# Patient Record
Sex: MALE | Race: WHITE | Employment: OTHER | URBAN - METROPOLITAN AREA
[De-identification: names, ages, dates, MRNs, and addresses within clinical notes are randomized per-mention and may not be internally consistent; named-entity substitution may affect disease eponyms.]

---

## 2018-07-05 ENCOUNTER — HOSPITAL ENCOUNTER (OUTPATIENT)
Dept: NEUROLOGY | Age: 23
Discharge: HOME | End: 2018-07-05
Payer: COMMERCIAL

## 2018-07-05 PROCEDURE — 95819 EEG AWAKE AND ASLEEP: CPT

## 2018-07-05 PROCEDURE — 95819 EEG AWAKE AND ASLEEP: CPT | Performed by: PSYCHIATRY & NEUROLOGY

## 2019-08-13 ENCOUNTER — HOSPITAL ENCOUNTER (OUTPATIENT)
Age: 24
Discharge: HOME | End: 2019-08-15
Payer: COMMERCIAL

## 2019-08-13 PROCEDURE — 84480 ASSAY TRIIODOTHYRONINE (T3): CPT

## 2019-08-13 PROCEDURE — 85027 COMPLETE CBC AUTOMATED: CPT

## 2019-08-13 PROCEDURE — 80053 COMPREHEN METABOLIC PANEL: CPT

## 2019-08-13 PROCEDURE — 80061 LIPID PANEL: CPT

## 2019-08-13 PROCEDURE — 84443 ASSAY THYROID STIM HORMONE: CPT

## 2019-08-13 PROCEDURE — 84436 ASSAY OF TOTAL THYROXINE: CPT

## 2019-08-14 LAB
ALBUMIN SERPL-MCNC: 4.4 G/DL (ref 3.5–5.2)
ALP BLD-CCNC: 97 U/L (ref 40–129)
ALT SERPL-CCNC: 19 U/L (ref 0–40)
ANION GAP SERPL CALCULATED.3IONS-SCNC: 14 MMOL/L (ref 7–16)
AST SERPL-CCNC: 21 U/L (ref 0–39)
BILIRUB SERPL-MCNC: 0.3 MG/DL (ref 0–1.2)
BUN BLDV-MCNC: 13 MG/DL (ref 6–20)
CALCIUM SERPL-MCNC: 9.7 MG/DL (ref 8.6–10.2)
CHLORIDE BLD-SCNC: 103 MMOL/L (ref 98–107)
CHOLESTEROL, TOTAL: 207 MG/DL (ref 0–199)
CO2: 25 MMOL/L (ref 22–29)
CREAT SERPL-MCNC: 0.9 MG/DL (ref 0.7–1.2)
GFR AFRICAN AMERICAN: >60
GFR NON-AFRICAN AMERICAN: >60 ML/MIN/1.73
GLUCOSE BLD-MCNC: 89 MG/DL (ref 74–99)
HCT VFR BLD CALC: 46.7 % (ref 37–54)
HDLC SERPL-MCNC: 56 MG/DL
HEMOGLOBIN: 15.1 G/DL (ref 12.5–16.5)
LDL CHOLESTEROL CALCULATED: 139 MG/DL (ref 0–99)
MCH RBC QN AUTO: 28.3 PG (ref 26–35)
MCHC RBC AUTO-ENTMCNC: 32.3 % (ref 32–34.5)
MCV RBC AUTO: 87.6 FL (ref 80–99.9)
PDW BLD-RTO: 13.1 FL (ref 11.5–15)
PLATELET # BLD: 275 E9/L (ref 130–450)
PMV BLD AUTO: 10 FL (ref 7–12)
POTASSIUM SERPL-SCNC: 4.6 MMOL/L (ref 3.5–5)
RBC # BLD: 5.33 E12/L (ref 3.8–5.8)
SODIUM BLD-SCNC: 142 MMOL/L (ref 132–146)
T3 TOTAL: 146.4 NG/DL (ref 80–200)
T4 TOTAL: 6.2 MCG/DL (ref 4.5–11.7)
TOTAL PROTEIN: 8.2 G/DL (ref 6.4–8.3)
TRIGL SERPL-MCNC: 62 MG/DL (ref 0–149)
TSH SERPL DL<=0.05 MIU/L-ACNC: 3.13 UIU/ML (ref 0.27–4.2)
VLDLC SERPL CALC-MCNC: 12 MG/DL
WBC # BLD: 7.2 E9/L (ref 4.5–11.5)

## 2019-08-19 ENCOUNTER — HOSPITAL ENCOUNTER (OUTPATIENT)
Age: 24
Discharge: HOME | End: 2019-08-21
Payer: COMMERCIAL

## 2019-08-19 PROCEDURE — 87591 N.GONORRHOEAE DNA AMP PROB: CPT

## 2019-08-19 PROCEDURE — 87491 CHLMYD TRACH DNA AMP PROBE: CPT

## 2019-08-22 LAB
C. TRACHOMATIS DNA ,URINE: NEGATIVE
N. GONORRHOEAE DNA, URINE: NEGATIVE
SOURCE: NORMAL

## 2019-09-27 ENCOUNTER — HOSPITAL ENCOUNTER (EMERGENCY)
Age: 24
Discharge: HOME | End: 2019-09-27
Attending: EMERGENCY MEDICINE
Payer: COMMERCIAL

## 2019-09-27 VITALS
HEART RATE: 90 BPM | HEIGHT: 78 IN | OXYGEN SATURATION: 97 % | TEMPERATURE: 98.8 F | SYSTOLIC BLOOD PRESSURE: 182 MMHG | RESPIRATION RATE: 20 BRPM | BODY MASS INDEX: 36.45 KG/M2 | DIASTOLIC BLOOD PRESSURE: 92 MMHG | WEIGHT: 315 LBS

## 2019-09-27 DIAGNOSIS — K62.5 RECTAL BLEEDING: Primary | ICD-10-CM

## 2019-09-27 PROCEDURE — 99282 EMERGENCY DEPT VISIT SF MDM: CPT

## 2019-09-27 ASSESSMENT — ENCOUNTER SYMPTOMS
EYE PAIN: 0
BLOOD IN STOOL: 1
NAUSEA: 0
WHEEZING: 0
SORE THROAT: 0
EYE DISCHARGE: 0
ABDOMINAL PAIN: 0
DIARRHEA: 0
EYE REDNESS: 0
SINUS PRESSURE: 0
BACK PAIN: 0
COUGH: 0
VOMITING: 0
SHORTNESS OF BREATH: 0

## 2019-09-27 NOTE — ED PROVIDER NOTES
hemorrhoid and guaiac positive stool. Rectal exam shows no external hemorrhoid, no fissure, no mass and no tenderness. Musculoskeletal: Normal range of motion. He exhibits no edema. Neurological: He is alert and oriented to person, place, and time. Skin: Skin is warm and dry. No rash noted. He is not diaphoretic. Nursing note and vitals reviewed. Procedures     [facility]              --------------------------------------------- PAST HISTORY ---------------------------------------------  Past Medical History:  has a past medical history of Autism, [facility] ([facility] and Seizures ([facility] Past Surgical History:  has no past surgical history on file. Social History:      Family History: family history is not on file. The patients home medications have been reviewed. Allergies: Patient has no known allergies. -------------------------------------------------- RESULTS -------------------------------------------------  Labs:  No results found for this visit on 09/27/19. Radiology:  No orders to display       ------------------------- NURSING NOTES AND VITALS REVIEWED ---------------------------  Date / Time Roomed:  9/27/2019  3:29 AM  ED Bed Assignment:  14/14    The nursing notes within the ED encounter and vital signs as below have been reviewed. BP (!) 182/92   Pulse 90   Temp 98.8 °F (37.1 °C) (Oral)   Resp 20   Ht 6' 6.5\" (1.994 m)   Wt (!) 317 lb (143.8 kg)   SpO2 97%   BMI 36.17 kg/m²   Oxygen Saturation Interpretation: Normal      ------------------------------------------ PROGRESS NOTES ------------------------------------------  I have spoken with the patient and discussed todays results, in addition to providing specific details for the plan of care and counseling regarding the diagnosis and prognosis. Their questions are answered at this time and they are agreeable with the plan. I discussed at length with them reasons for immediate return here for re evaluation.

## 2019-10-04 ENCOUNTER — HOSPITAL ENCOUNTER (OUTPATIENT)
Age: 24
Discharge: HOME | End: 2019-10-06
Payer: MEDICARE

## 2019-10-04 LAB — PROSTATE SPECIFIC ANTIGEN: 0.49 NG/ML (ref 0–4)

## 2019-10-04 PROCEDURE — G0103 PSA SCREENING: HCPCS

## 2020-02-19 ENCOUNTER — HOSPITAL ENCOUNTER (OUTPATIENT)
Age: 25
Discharge: HOME | End: 2020-02-21
Payer: COMMERCIAL

## 2020-02-19 LAB
ALBUMIN SERPL-MCNC: 4.6 G/DL (ref 3.5–5.2)
ALP BLD-CCNC: 88 U/L (ref 40–129)
ALT SERPL-CCNC: 28 U/L (ref 0–40)
ANION GAP SERPL CALCULATED.3IONS-SCNC: 13 MMOL/L (ref 7–16)
AST SERPL-CCNC: 22 U/L (ref 0–39)
BILIRUB SERPL-MCNC: 0.2 MG/DL (ref 0–1.2)
BUN BLDV-MCNC: 11 MG/DL (ref 6–20)
CALCIUM SERPL-MCNC: 10.1 MG/DL (ref 8.6–10.2)
CHLORIDE BLD-SCNC: 104 MMOL/L (ref 98–107)
CO2: 23 MMOL/L (ref 22–29)
CREAT SERPL-MCNC: 0.8 MG/DL (ref 0.7–1.2)
GFR AFRICAN AMERICAN: >60
GFR NON-AFRICAN AMERICAN: >60 ML/MIN/1.73
GLUCOSE BLD-MCNC: 100 MG/DL (ref 74–99)
HCT VFR BLD CALC: 46.9 % (ref 37–54)
HEMOGLOBIN: 15.1 G/DL (ref 12.5–16.5)
MCH RBC QN AUTO: 28.3 PG (ref 26–35)
MCHC RBC AUTO-ENTMCNC: 32.2 % (ref 32–34.5)
MCV RBC AUTO: 88 FL (ref 80–99.9)
PDW BLD-RTO: 13.1 FL (ref 11.5–15)
PLATELET # BLD: 277 E9/L (ref 130–450)
PMV BLD AUTO: 10.1 FL (ref 7–12)
POTASSIUM SERPL-SCNC: 4.3 MMOL/L (ref 3.5–5)
RBC # BLD: 5.33 E12/L (ref 3.8–5.8)
SODIUM BLD-SCNC: 140 MMOL/L (ref 132–146)
TOTAL PROTEIN: 8.1 G/DL (ref 6.4–8.3)
TSH SERPL DL<=0.05 MIU/L-ACNC: 2.47 UIU/ML (ref 0.27–4.2)
WBC # BLD: 6.8 E9/L (ref 4.5–11.5)

## 2020-02-19 PROCEDURE — 85027 COMPLETE CBC AUTOMATED: CPT

## 2020-02-19 PROCEDURE — 84443 ASSAY THYROID STIM HORMONE: CPT

## 2020-02-19 PROCEDURE — 80053 COMPREHEN METABOLIC PANEL: CPT

## 2021-04-01 ENCOUNTER — TELEPHONE (OUTPATIENT)
Dept: ADMINISTRATIVE | Age: 26
End: 2021-04-01

## 2021-04-01 NOTE — TELEPHONE ENCOUNTER
Pt called requesting a NP appt with you. I informed him you were not accepting patients at this time, but he thinks his mom already spoke to you. He went on to say he is a current pt of Dr Eduardo Gupta, and he needs to transfer care. He happened to mention his mom's name, Noreen Justice) and I was able to see she is your patient. There is a note in his chart that he is permanently disabled and perhaps that conversation did take place. I believe the pt understands--I wasn't promising him an appointment and I assured him--I would call him back. I thought (for this call) I should send you the I information and you would let me know how to proceed.         Please advise,       Thank you

## 2021-04-01 NOTE — TELEPHONE ENCOUNTER
I'm seeing his other family members, so I can see him, but no chronic narcotics or benzo medications

## 2021-05-04 ENCOUNTER — OFFICE VISIT (OUTPATIENT)
Dept: PRIMARY CARE CLINIC | Age: 26
End: 2021-05-04
Payer: COMMERCIAL

## 2021-05-04 VITALS
HEART RATE: 92 BPM | HEIGHT: 78 IN | DIASTOLIC BLOOD PRESSURE: 86 MMHG | TEMPERATURE: 97 F | OXYGEN SATURATION: 97 % | WEIGHT: 315 LBS | SYSTOLIC BLOOD PRESSURE: 136 MMHG | BODY MASS INDEX: 36.45 KG/M2

## 2021-05-04 DIAGNOSIS — R73.01 IMPAIRED FASTING GLUCOSE: ICD-10-CM

## 2021-05-04 DIAGNOSIS — F84.0 AUTISM: ICD-10-CM

## 2021-05-04 DIAGNOSIS — I10 ESSENTIAL HYPERTENSION: Primary | ICD-10-CM

## 2021-05-04 DIAGNOSIS — E55.9 VITAMIN D INSUFFICIENCY: ICD-10-CM

## 2021-05-04 PROCEDURE — 1036F TOBACCO NON-USER: CPT | Performed by: FAMILY MEDICINE

## 2021-05-04 PROCEDURE — 99204 OFFICE O/P NEW MOD 45 MIN: CPT | Performed by: FAMILY MEDICINE

## 2021-05-04 PROCEDURE — G8427 DOCREV CUR MEDS BY ELIG CLIN: HCPCS | Performed by: FAMILY MEDICINE

## 2021-05-04 PROCEDURE — G8417 CALC BMI ABV UP PARAM F/U: HCPCS | Performed by: FAMILY MEDICINE

## 2021-05-04 RX ORDER — LAMOTRIGINE 200 MG/1
200 TABLET ORAL DAILY
COMMUNITY

## 2021-05-04 RX ORDER — TRIAMCINOLONE ACETONIDE 1 MG/G
CREAM TOPICAL 2 TIMES DAILY
COMMUNITY

## 2021-05-04 RX ORDER — CLONIDINE HYDROCHLORIDE 0.2 MG/1
0.2 TABLET ORAL NIGHTLY
COMMUNITY
End: 2021-09-14

## 2021-05-04 RX ORDER — DEXMETHYLPHENIDATE HYDROCHLORIDE 10 MG/1
10 TABLET ORAL 3 TIMES DAILY
COMMUNITY

## 2021-05-04 RX ORDER — LISINOPRIL 10 MG/1
10 TABLET ORAL DAILY
Qty: 90 TABLET | Refills: 1 | Status: SHIPPED
Start: 2021-05-04 | End: 2021-12-03 | Stop reason: SDUPTHER

## 2021-05-04 RX ORDER — LISINOPRIL 10 MG/1
10 TABLET ORAL DAILY
COMMUNITY
End: 2021-05-04 | Stop reason: SDUPTHER

## 2021-05-04 ASSESSMENT — PATIENT HEALTH QUESTIONNAIRE - PHQ9
SUM OF ALL RESPONSES TO PHQ9 QUESTIONS 1 & 2: 2
SUM OF ALL RESPONSES TO PHQ QUESTIONS 1-9: 2
1. LITTLE INTEREST OR PLEASURE IN DOING THINGS: 1
SUM OF ALL RESPONSES TO PHQ QUESTIONS 1-9: 2

## 2021-05-04 ASSESSMENT — ENCOUNTER SYMPTOMS
DIARRHEA: 0
SHORTNESS OF BREATH: 0
VOMITING: 0
COUGH: 0
ABDOMINAL PAIN: 0
NAUSEA: 0
BACK PAIN: 0
CONSTIPATION: 0
WHEEZING: 0

## 2021-05-04 NOTE — PROGRESS NOTES
21  Driss Stager : 1995 Sex: male  Age: Jose 39 y.o. Chief Complaint   Patient presents with   [address]     previous pcp dr Ronald Ridley     HPI:  Jose 39 y.o. male presents today to establish care with a new PCP. Previously seen by Dr. Kiersten Moran. Patient's chart, medical, surgical and medication history all reviewed. Hypertension   The patient presents today for follow up of HTN. The problem is well controlled. Risk factors for coronary artery disease include male, HTN and family Hx. Current treatments include lisinopril (Prinivil). The patient is compliant all of the time. Lifestyle changes the patient has made include none. Today the patient is complaining of none. Patient has a long standing history of autism. Follows with Dr. Cindi Guerra for medication management. ROS:  Review of Systems   Constitutional: Negative for chills, fatigue and fever. Respiratory: Negative for cough, shortness of breath and wheezing. Cardiovascular: Negative for chest pain and palpitations. Gastrointestinal: Negative for abdominal pain, constipation, diarrhea, nausea and vomiting. Musculoskeletal: Negative for arthralgias and back pain. Skin: Negative for rash. Neurological: Negative for dizziness and headaches. Psychiatric/Behavioral: Positive for behavioral problems. Negative for dysphoric mood. The patient is not nervous/anxious. All other systems reviewed and are negative. Current Outpatient Medications on File Prior to Visit   Medication Sig Dispense Refill    triamcinolone (KENALOG) 0.1 % cream Apply topically 2 times daily Apply topically 2 times daily.  cloNIDine (CATAPRES) 0.2 MG tablet Take 0.2 mg by mouth nightly      lamoTRIgine (LAMICTAL) 200 MG tablet Take 200 mg by mouth daily Take 1 and a half tablets by mouth at bedtime and one half tablet at noon.  dexmethylphenidate (FOCALIN) 10 MG tablet Take 10 mg by mouth 3 times daily.  Take 2 tablets by mouth in the morning, 2 tablets at noon, and 1 tablet at 3pm      Multiple Vitamin (MULTI-VITAMIN DAILY PO) Take by mouth      ziprasidone (GEODON) 20 MG capsule Take 40 mg by mouth 2 times daily (with meals). No current facility-administered medications on file prior to visit. No Known Allergies    Past Medical History:   Diagnosis Date    Autism     [facility]     Essential hypertension 5/4/2021    [facility]      History reviewed. No pertinent surgical history. History reviewed. No pertinent family history.   Social History     Socioeconomic History    Marital status: Single     Spouse name: Not on file    Number of children: Not on file    Years of education: Not on file    Highest education level: Not on file   Occupational History    Not on file   Social Needs    Financial resource strain: Not on file    Food insecurity     Worry: Not on file     Inability: Not on file    Transportation needs     Medical: Not on file     Non-medical: Not on file   Tobacco Use    Smoking status: Never Smoker    Smokeless tobacco: Never Used   Substance and Sexual Activity    Alcohol use: Not Currently    Drug use: Not Currently    Sexual activity: Not on file   Lifestyle    Physical activity     Days per week: Not on file     Minutes per session: Not on file    Stress: Not on file   Relationships    Social connections     Talks on phone: Not on file     Gets together: Not on file     Attends Sikhism service: Not on file     Active member of club or organization: Not on file     Attends meetings of clubs or organizations: Not on file     Relationship status: Not on file    Intimate partner violence     Fear of current or ex partner: Not on file     Emotionally abused: Not on file     Physically abused: Not on file     Forced sexual activity: Not on file   Other Topics Concern    Not on file   Social History Narrative    Not on file       Vitals:    05/04/21 1054   BP: 136/86   Pulse: Value Date     02/19/2020    K 4.3 02/19/2020     02/19/2020    CO2 23 02/19/2020    BUN 11 02/19/2020    CREATININE 0.8 02/19/2020    GFRAA >60 02/19/2020    LABGLOM >60 02/19/2020    GLUCOSE 100 02/19/2020    PROT 8.1 02/19/2020    LABALBU 4.6 02/19/2020    CALCIUM 10.1 02/19/2020    BILITOT 0.2 02/19/2020    ALKPHOS 88 02/19/2020    AST 22 02/19/2020    ALT 28 02/19/2020     U/A:  No results found for: NITRITE, COLORU, PROTEINU, PHUR, LABCAST, WBCUA, RBCUA, MUCUS, TRICHOMONAS, YEAST, BACTERIA, CLARITYU, SPECGRAV, LEUKOCYTESUR, UROBILINOGEN, BILIRUBINUR, BLOODU, GLUCOSEU, AMORPHOUS  HgBA1c:  No results found for: LABA1C  FLP:    Lab Results   Component Value Date    TRIG 62 08/13/2019    HDL 56 08/13/2019    LDLCALC 139 08/13/2019    LABVLDL 12 08/13/2019     TSH:    Lab Results   Component Value Date    TSH 2.470 02/19/2020        Assessment and Plan:  Janice Hooks was seen today for establish care. Diagnoses and all orders for this visit:    Essential hypertension  -     lisinopril (PRINIVIL;ZESTRIL) 10 MG tablet; Take 1 tablet by mouth daily  -     CBC Auto Differential; Future  -     Comprehensive Metabolic Panel; Future  -     Lipid Panel; Future  -     TSH without Reflex; Future  -     Urinalysis; Future  Well controlled on current medication. Overdue for fasting labs. Autism  Follows with Dr. Blake Keith for medication management. Vitamin D insufficiency  -     Vitamin D 25 Hydroxy; Future    Impaired fasting glucose  -     Hemoglobin A1C; Future        Return in about 6 months (around 11/4/2021) for Recheck.       Seen By:  Ian Guaman,

## 2021-05-05 DIAGNOSIS — I10 ESSENTIAL HYPERTENSION: ICD-10-CM

## 2021-05-05 DIAGNOSIS — R73.01 IMPAIRED FASTING GLUCOSE: ICD-10-CM

## 2021-05-05 DIAGNOSIS — E55.9 VITAMIN D INSUFFICIENCY: ICD-10-CM

## 2021-05-05 LAB
ALBUMIN SERPL-MCNC: 4.4 G/DL (ref 3.5–5.2)
ALP BLD-CCNC: 85 U/L (ref 40–129)
ALT SERPL-CCNC: 35 U/L (ref 0–40)
ANION GAP SERPL CALCULATED.3IONS-SCNC: 12 MMOL/L (ref 7–16)
AST SERPL-CCNC: 28 U/L (ref 0–39)
BASOPHILS ABSOLUTE: 0.03 E9/L (ref 0–0.2)
BASOPHILS RELATIVE PERCENT: 0.5 % (ref 0–2)
BILIRUB SERPL-MCNC: 0.5 MG/DL (ref 0–1.2)
BILIRUBIN URINE: NEGATIVE
BLOOD, URINE: NEGATIVE
BUN BLDV-MCNC: 10 MG/DL (ref 6–20)
CALCIUM SERPL-MCNC: 10 MG/DL (ref 8.6–10.2)
CHLORIDE BLD-SCNC: 101 MMOL/L (ref 98–107)
CHOLESTEROL, TOTAL: 211 MG/DL (ref 0–199)
CLARITY: CLEAR
CO2: 26 MMOL/L (ref 22–29)
COLOR: YELLOW
CREAT SERPL-MCNC: 0.8 MG/DL (ref 0.7–1.2)
EOSINOPHILS ABSOLUTE: 0.17 E9/L (ref 0.05–0.5)
EOSINOPHILS RELATIVE PERCENT: 2.8 % (ref 0–6)
GFR AFRICAN AMERICAN: >60
GFR NON-AFRICAN AMERICAN: >60 ML/MIN/1.73
GLUCOSE BLD-MCNC: 98 MG/DL (ref 74–99)
GLUCOSE URINE: NEGATIVE MG/DL
HBA1C MFR BLD: 5.4 % (ref 4–5.6)
HCT VFR BLD CALC: 44.6 % (ref 37–54)
HDLC SERPL-MCNC: 55 MG/DL
HEMOGLOBIN: 14.7 G/DL (ref 12.5–16.5)
IMMATURE GRANULOCYTES #: 0.01 E9/L
IMMATURE GRANULOCYTES %: 0.2 % (ref 0–5)
KETONES, URINE: NEGATIVE MG/DL
LDL CHOLESTEROL CALCULATED: 137 MG/DL (ref 0–99)
LEUKOCYTE ESTERASE, URINE: NEGATIVE
LYMPHOCYTES ABSOLUTE: 1.81 E9/L (ref 1.5–4)
LYMPHOCYTES RELATIVE PERCENT: 30 % (ref 20–42)
MCH RBC QN AUTO: 28.5 PG (ref 26–35)
MCHC RBC AUTO-ENTMCNC: 33 % (ref 32–34.5)
MCV RBC AUTO: 86.6 FL (ref 80–99.9)
MONOCYTES ABSOLUTE: 0.59 E9/L (ref 0.1–0.95)
MONOCYTES RELATIVE PERCENT: 9.8 % (ref 2–12)
NEUTROPHILS ABSOLUTE: 3.43 E9/L (ref 1.8–7.3)
NEUTROPHILS RELATIVE PERCENT: 56.7 % (ref 43–80)
NITRITE, URINE: NEGATIVE
PDW BLD-RTO: 13.2 FL (ref 11.5–15)
PH UA: 6.5 (ref 5–9)
PLATELET # BLD: 242 E9/L (ref 130–450)
PMV BLD AUTO: 9.6 FL (ref 7–12)
POTASSIUM SERPL-SCNC: 4.4 MMOL/L (ref 3.5–5)
PROTEIN UA: NEGATIVE MG/DL
RBC # BLD: 5.15 E12/L (ref 3.8–5.8)
SODIUM BLD-SCNC: 139 MMOL/L (ref 132–146)
SPECIFIC GRAVITY UA: 1.02 (ref 1–1.03)
TOTAL PROTEIN: 7.9 G/DL (ref 6.4–8.3)
TRIGL SERPL-MCNC: 96 MG/DL (ref 0–149)
TSH SERPL DL<=0.05 MIU/L-ACNC: 1.81 UIU/ML (ref 0.27–4.2)
UROBILINOGEN, URINE: 1 E.U./DL
VITAMIN D 25-HYDROXY: 13 NG/ML (ref 30–100)
VLDLC SERPL CALC-MCNC: 19 MG/DL
WBC # BLD: 6 E9/L (ref 4.5–11.5)

## 2021-05-06 RX ORDER — ERGOCALCIFEROL 1.25 MG/1
50000 CAPSULE ORAL WEEKLY
Qty: 12 CAPSULE | Refills: 1 | Status: SHIPPED
Start: 2021-05-06 | End: 2021-11-29

## 2021-06-01 ENCOUNTER — TELEPHONE (OUTPATIENT)
Dept: PRIMARY CARE CLINIC | Age: 26
End: 2021-06-01

## 2021-06-01 NOTE — TELEPHONE ENCOUNTER
Patient is requesting an appointment with Dr Jenniffer Diehl to discuss his headaches. He said they are getting worse. Would like to see His PCP. I advised of express care declined. No soon appointments available in either office. Please contact patient if he can be fit on the schedule.  Thank you

## 2021-06-14 ENCOUNTER — OFFICE VISIT (OUTPATIENT)
Dept: PRIMARY CARE CLINIC | Age: 26
End: 2021-06-14
Payer: COMMERCIAL

## 2021-06-14 VITALS
HEIGHT: 78 IN | BODY MASS INDEX: 36.1 KG/M2 | DIASTOLIC BLOOD PRESSURE: 84 MMHG | HEART RATE: 85 BPM | OXYGEN SATURATION: 97 % | SYSTOLIC BLOOD PRESSURE: 132 MMHG | TEMPERATURE: 97.5 F | WEIGHT: 312 LBS

## 2021-06-14 DIAGNOSIS — G44.209 TENSION HEADACHE: Primary | ICD-10-CM

## 2021-06-14 PROCEDURE — 99213 OFFICE O/P EST LOW 20 MIN: CPT | Performed by: FAMILY MEDICINE

## 2021-06-14 PROCEDURE — 1036F TOBACCO NON-USER: CPT | Performed by: FAMILY MEDICINE

## 2021-06-14 PROCEDURE — G8417 CALC BMI ABV UP PARAM F/U: HCPCS | Performed by: FAMILY MEDICINE

## 2021-06-14 PROCEDURE — G8427 DOCREV CUR MEDS BY ELIG CLIN: HCPCS | Performed by: FAMILY MEDICINE

## 2021-06-14 RX ORDER — BACLOFEN 10 MG/1
10 TABLET ORAL 3 TIMES DAILY
Qty: 90 TABLET | Refills: 5 | Status: SHIPPED | OUTPATIENT
Start: 2021-06-14 | End: 2021-07-14

## 2021-06-14 ASSESSMENT — ENCOUNTER SYMPTOMS
VOMITING: 0
ABDOMINAL PAIN: 0
NAUSEA: 0
CONSTIPATION: 0
BACK PAIN: 0
DIARRHEA: 0
COUGH: 0
SHORTNESS OF BREATH: 0
WHEEZING: 0

## 2021-06-14 NOTE — PATIENT INSTRUCTIONS
your doctor figure out what triggers your headaches. If you avoid your triggers, you may be able to prevent headaches. · It's good to include several things in your headache diary. Write down when a headache begins and how long it lasts. Try to describe what the pain was like (throbbing, aching, stabbing, or dull). Then add anything you think may have triggered the headache. This could include stress, anxiety, or depression. It could also include hunger, anger, or fatigue. Sometimes, bad posture and muscle strain are triggers for people. · Find healthy ways to deal with stress. Headaches are most common during or right after stressful times. Take time to relax before and after you do something that caused a headache in the past.  · Get plenty of exercise every day. Go for a walk or jog, ride a bike, or find other ways to be active. This can help with stress and muscle tension. · Get regular sleep. · Eat regularly and well. If you wait too long to eat, it can trigger a headache. · If you have the time and money, you may want to try massage. Some people find that regular massages really help relieve tension. · Try to use good posture and keep the muscles of your jaw, face, neck, and shoulders relaxed. If you sit at a desk, change positions often. Try to stretch for 30 seconds every hour. · If you use a computer a lot, you can do things to make your eyes less tired. Try blinking more and sometimes looking away from the screen. Be sure to use glasses or contacts if you need them. And check that your monitor is about an arm's distance away from you. When should you call for help? Call 911 anytime you think you may need emergency care. For example, call if:    · You have signs of a stroke. These may include:  ? Sudden numbness, paralysis, or weakness in your face, arm, or leg, especially on only one side of your body. ? Sudden vision changes. ? Sudden trouble speaking.   ? Sudden confusion or trouble to 4 times toward each shoulder. Upper trapezius stretch   1. Sit in a firm chair, or stand up straight. 2. This stretch works best if you keep your shoulder down as you lean away from it. To help you remember to do this, start by relaxing your shoulders and lightly holding on to your thighs or your chair. 3. Tilt your head toward your shoulder and hold for 15 to 30 seconds. Let the weight of your head stretch your muscles. 4. If you would like a little added stretch, place your arm behind your back. Use the arm opposite of the direction you are tilting your head. For example, if you are tilting your head to the left, place your right arm behind your back. 5. Repeat 2 to 4 times toward each shoulder. Neck rotation   1. Sit in a firm chair, or stand up straight. 2. Keeping your chin level, turn your head to the right, and hold for 15 to 30 seconds. 3. Turn your head to the left, and hold for 15 to 30 seconds. 4. Repeat 2 to 4 times to each side. Chin tuck   1. Lie on the floor with a rolled-up towel under your neck. Your head should be touching the floor. 2. Slowly bring your chin toward the front of your neck. 3. Hold for a count of 6, and then relax for up to 10 seconds. 4. Repeat 8 to 12 times. Forward neck flexion   1. Sit in a firm chair, or stand up straight. 2. Bend your head forward. 3. Hold for 15 to 30 seconds, then return to your starting position. 4. Repeat 2 to 4 times. Follow-up care is a key part of your treatment and safety. Be sure to make and go to all appointments, and call your doctor if you are having problems. It's also a good idea to know your test results and keep a list of the medicines you take. Where can you learn more? Go to [email or web address] org and sign in to your [employer] account. Enter P962 in the [employer] box to learn more about \"Neck Spasm: Exercises. \"     If you do not have an account, please click on the \"Sign Up Now\" link.  Current as of: November 16, 2020               Content Version: 12.8  © 4863-4301 [employer]. Care instructions adapted under license by [facility] ([facility]). If you have questions about a medical condition or this instruction, always ask your healthcare professional. [address] any warranty or liability for your use of this information.

## 2021-06-14 NOTE — PROGRESS NOTES
morning, 2 tablets at noon, and 1 tablet at 3pm      Multiple Vitamin (MULTI-VITAMIN DAILY PO) Take by mouth      lisinopril (PRINIVIL;ZESTRIL) 10 MG tablet Take 1 tablet by mouth daily 90 tablet 1    ziprasidone (GEODON) 20 MG capsule Take 40 mg by mouth 2 times daily (with meals). No current facility-administered medications on file prior to visit. No Known Allergies    Past Medical History:   Diagnosis Date    Autism     [facility] ([facility]     Essential hypertension 5/4/2021    [facility]      History reviewed. No pertinent surgical history. History reviewed. No pertinent family history. Social History     Socioeconomic History    Marital status: Single     Spouse name: Not on file    Number of children: Not on file    Years of education: Not on file    Highest education level: Not on file   Occupational History    Not on file   Tobacco Use    Smoking status: Never Smoker    Smokeless tobacco: Never Used   Substance and Sexual Activity    Alcohol use: Not Currently    Drug use: Not Currently    Sexual activity: Not on file   Other Topics Concern    Not on file   Social History Narrative    Not on file     Social Determinants of Health     Financial Resource Strain:     Difficulty of Paying Living Expenses:    Food Insecurity:     Worried About Running Out of Food in the Last Year:     [address] in the Last Year:    Transportation Needs:     Lack of Transportation (Medical):      Lack of Transportation (Non-Medical):    Physical Activity:     Days of Exercise per Week:     Minutes of Exercise per Session:    Stress:     Feeling of Stress :    Social Connections:     Frequency of Communication with Friends and Family:     Frequency of Social Gatherings with Friends and Family:     Attends Mormon Services:     Active Member of Clubs or Organizations:     Attends Club or Organization Meetings:     Marital Status:    Intimate Partner Violence:     Fear of Current or Ex-Partner:     Emotionally Abused:     Physically Abused:     Sexually Abused:        Vitals:    06/14/21 0837   BP: 132/84   Pulse: 85   Temp: 97.5 °F (36.4 °C)   SpO2: 97%   Weight: (!) 312 lb (141.5 kg)   Height: 6' 6.5\" (1.994 m)       Physical Exam:  Physical Exam  Vitals and nursing note reviewed. Constitutional:       General: He is not in acute distress. Appearance: Normal appearance. He is well-developed. HENT:      Head: Normocephalic and atraumatic. Right Ear: Hearing and external ear normal.      Left Ear: Hearing and external ear normal.      Nose:      Comments: Wearing mask  Eyes:      General: Lids are normal. No scleral icterus. Extraocular Movements: Extraocular movements intact. Conjunctiva/sclera: Conjunctivae normal.   Neck:      Thyroid: No thyromegaly. Cardiovascular:      Rate and Rhythm: Normal rate and regular rhythm. Heart sounds: Normal heart sounds. No murmur heard. Pulmonary:      Effort: Pulmonary effort is normal. No respiratory distress. Breath sounds: Normal breath sounds. No wheezing. Musculoskeletal:         General: No tenderness or deformity. Normal range of motion. Cervical back: Normal range of motion and neck supple. Right lower leg: No edema. Left lower leg: No edema. Lymphadenopathy:      Cervical: No cervical adenopathy. Skin:     General: Skin is warm and dry. Findings: No rash. Neurological:      General: No focal deficit present. Mental Status: He is alert and oriented to person, place, and time. Gait: Gait normal.   Psychiatric:         Mood and Affect: Affect normal. Mood is anxious. Speech: Speech is rapid and pressured. Behavior: Behavior normal.         Thought Content:  Thought content normal.         Labs:  CBC with Differential:    Lab Results   Component Value Date    WBC 6.0 05/05/2021    RBC 5.15 05/05/2021    HGB 14.7 05/05/2021    HCT 44.6 05/05/2021  05/05/2021    MCV 86.6 05/05/2021    MCH 28.5 05/05/2021    MCHC 33.0 05/05/2021    RDW 13.2 05/05/2021    LYMPHOPCT 30.0 05/05/2021    MONOPCT 9.8 05/05/2021    BASOPCT 0.5 05/05/2021    MONOSABS 0.59 05/05/2021    LYMPHSABS 1.81 05/05/2021    EOSABS 0.17 05/05/2021    BASOSABS 0.03 05/05/2021     CMP:    Lab Results   Component Value Date     05/05/2021    K 4.4 05/05/2021     05/05/2021    CO2 26 05/05/2021    BUN 10 05/05/2021    CREATININE 0.8 05/05/2021    GFRAA >60 05/05/2021    LABGLOM >60 05/05/2021    GLUCOSE 98 05/05/2021    PROT 7.9 05/05/2021    LABALBU 4.4 05/05/2021    CALCIUM 10.0 05/05/2021    BILITOT 0.5 05/05/2021    ALKPHOS 85 05/05/2021    AST 28 05/05/2021    ALT 35 05/05/2021     U/A:    Lab Results   Component Value Date    COLORU Yellow 05/05/2021    PROTEINU Negative 05/05/2021    PHUR 6.5 05/05/2021    CLARITYU Clear 05/05/2021    SPECGRAV 1.025 05/05/2021    LEUKOCYTESUR Negative 05/05/2021    UROBILINOGEN 1.0 05/05/2021    BILIRUBINUR Negative 05/05/2021    BLOODU Negative 05/05/2021    GLUCOSEU Negative 05/05/2021     HgBA1c:    Lab Results   Component Value Date    LABA1C 5.4 05/05/2021     FLP:    Lab Results   Component Value Date    TRIG 96 05/05/2021    HDL 55 05/05/2021    LDLCALC 137 05/05/2021    LABVLDL 19 05/05/2021     TSH:    Lab Results   Component Value Date    TSH 1.810 05/05/2021        Assessment and Plan:  Liliana Ford was seen today for headache and anxiety. Diagnoses and all orders for this visit:    Tension headache  -     baclofen (LIORESAL) 10 MG tablet; Take 1 tablet by mouth 3 times daily    Patient follows with Dr. Alida Du for mental health management. Do not want to add any anti-anxiety medications without psych review first.  Has appointment in July and will discuss then. In the meantime, will add a muscle relaxer. Exercises/stretches provided to try as well.        Return in about 3 months (around 9/14/2021), or if symptoms worsen or

## 2021-07-30 ENCOUNTER — NURSE TRIAGE (OUTPATIENT)
Dept: OTHER | Facility: CLINIC | Age: 26
End: 2021-07-30

## 2021-07-30 NOTE — TELEPHONE ENCOUNTER
Received call from Jake Calloway at [facility] with [employer]. Brief description of call: Pt calls in reporting that he has had some changes in behavior (getting angry quickly), is distracted easily and has periods of dizziness. Pt is concerned about a tumor and would like a \"body scan\" to rule this out. Pt declines triage, no current symptoms. Writer provided warm transfer to [facility] AND [facility] at [facility] for appointment scheduling. Attention Provider: Thank you for allowing me to participate in the care of your patient. The patient was connected to triage in response to information provided to the E[facility]. Please do not respond through this encounter as the response is not directed to a shared pool.       Reason for Disposition   Caller has already spoken with the PCP (or office), and has no further questions    Protocols used: NO CONTACT OR DUPLICATE CONTACT CALL-ADULT-[place]

## 2021-08-02 ENCOUNTER — TELEPHONE (OUTPATIENT)
Dept: PRIMARY CARE CLINIC | Age: 26
End: 2021-08-02

## 2021-08-02 NOTE — TELEPHONE ENCOUNTER
Spoke with patient. He states since his last visit here has been experiencing increased headaches, personality changes where he is getting angry easily, slurring his words, hearing problems, and problems with memory. He is worried that he has a brain tumor and is requesting a body scan. He is available today until 4 and any time after 12 tomorrow.

## 2021-08-02 NOTE — TELEPHONE ENCOUNTER
I do not feel that he has a brain tumor. I suspect this is from his psychiatric medications.   I can see him in September as scheduled, but he should also discuss with his psychiatrist

## 2021-08-02 NOTE — TELEPHONE ENCOUNTER
----- Message from Bertrand Mcgee sent at 8/2/2021  2:12 PM EDT -----  Subject: Message to Provider    QUESTIONS  Information for Provider? Patient wants phone call for previous message   that was sent on 7/30 to be prior to 4PM on 8/2 or anytime 8/3. Would like   to discuss a brain scan and where he can get that done.   ---------------------------------------------------------------------------  --------------  CALL BACK INFO  What is the best way for the office to contact you? OK to leave message on   voicemail  Preferred Call Back Phone Number? [phone]  ---------------------------------------------------------------------------  --------------  SCRIPT ANSWERS  Relationship to Patient?  Self

## 2021-08-03 ENCOUNTER — HOSPITAL ENCOUNTER (EMERGENCY)
Age: 26
Discharge: HOME | End: 2021-08-03
Attending: EMERGENCY MEDICINE
Payer: COMMERCIAL

## 2021-08-03 ENCOUNTER — APPOINTMENT (OUTPATIENT)
Dept: CT IMAGING | Age: 26
End: 2021-08-03
Payer: COMMERCIAL

## 2021-08-03 ENCOUNTER — TELEPHONE (OUTPATIENT)
Dept: FAMILY MEDICINE CLINIC | Age: 26
End: 2021-08-03

## 2021-08-03 VITALS
OXYGEN SATURATION: 96 % | SYSTOLIC BLOOD PRESSURE: 154 MMHG | WEIGHT: 313 LBS | RESPIRATION RATE: 20 BRPM | TEMPERATURE: 99.3 F | DIASTOLIC BLOOD PRESSURE: 107 MMHG | BODY MASS INDEX: 36.21 KG/M2 | HEART RATE: 104 BPM | HEIGHT: 78 IN

## 2021-08-03 DIAGNOSIS — R51.9 NONINTRACTABLE HEADACHE, UNSPECIFIED CHRONICITY PATTERN, UNSPECIFIED HEADACHE TYPE: Primary | ICD-10-CM

## 2021-08-03 PROCEDURE — 70450 CT HEAD/BRAIN W/O DYE: CPT

## 2021-08-03 PROCEDURE — 99283 EMERGENCY DEPT VISIT LOW MDM: CPT

## 2021-08-03 ASSESSMENT — PAIN SCALES - GENERAL: PAINLEVEL_OUTOF10: 4

## 2021-08-03 ASSESSMENT — PAIN DESCRIPTION - LOCATION: LOCATION: HEAD

## 2021-08-03 ASSESSMENT — ENCOUNTER SYMPTOMS
SHORTNESS OF BREATH: 0
ABDOMINAL PAIN: 0
CHEST TIGHTNESS: 0

## 2021-08-03 ASSESSMENT — PAIN DESCRIPTION - ORIENTATION: ORIENTATION: RIGHT

## 2021-08-03 ASSESSMENT — PAIN DESCRIPTION - FREQUENCY: FREQUENCY: CONTINUOUS

## 2021-08-03 ASSESSMENT — PAIN DESCRIPTION - DESCRIPTORS: DESCRIPTORS: HEADACHE

## 2021-08-03 NOTE — ED PROVIDER NOTES
20-year-old male presenting from home via EMS. He reports he has had occasional headaches that come and go pretty quickly. History of bipolar and autism he reports. He is on multiple medications for these issues. He is currently awake, alert, oriented x4. Worried about the very brief and quickly resolving headache that he occasionally has. No current headache, no vision changes, no speech difficulties. Brief episodes, mild, few moments in duration, associated with what he believes is too much TV and video games. History reviewed. No pertinent family history. History reviewed. No pertinent surgical history. Review of Systems   Constitutional: Negative for chills and fever. Respiratory: Negative for chest tightness and shortness of breath. Cardiovascular: Negative for chest pain. Gastrointestinal: Negative for abdominal pain. Neurological: Positive for headaches. All other systems reviewed and are negative. Physical Exam  Constitutional:       General: He is not in acute distress. Appearance: He is well-developed. HENT:      Head: Normocephalic and atraumatic. Eyes:      Pupils: Pupils are equal, round, and reactive to light. Neck:      Thyroid: No thyromegaly. Cardiovascular:      Rate and Rhythm: Normal rate and regular rhythm. Pulmonary:      Effort: Pulmonary effort is normal. No respiratory distress. Breath sounds: Normal breath sounds. No wheezing. Abdominal:      General: There is no distension. Palpations: Abdomen is soft. There is no mass. Tenderness: There is no abdominal tenderness. There is no guarding or rebound. Musculoskeletal:         General: No tenderness. Normal range of motion. Cervical back: Normal range of motion and neck supple. Skin:     General: Skin is warm and dry. Findings: No erythema. Neurological:      Mental Status: He is alert and oriented to person, place, and time.       Cranial Nerves: No cranial nerve deficit. Procedures     MDM              --------------------------------------------- PAST HISTORY ---------------------------------------------  Past Medical History:  has a past medical history of Autism, Bipolar 1 disorder ([facility] [address] Essential hypertension, and [facility] Past Surgical History:  has no past surgical history on file. Social History:  reports that he has never smoked. He has never used smokeless tobacco. He reports previous alcohol use. He reports previous drug use. Family History: family history is not on file. The patients home medications have been reviewed. Allergies: Patient has no known allergies. -------------------------------------------------- RESULTS -------------------------------------------------  Labs:  No results found for this visit on 08/03/21. Radiology:  CT HEAD WO CONTRAST   Final Result   No acute intracranial abnormality.             ------------------------- NURSING NOTES AND VITALS REVIEWED ---------------------------  Date / Time Roomed:  8/3/2021  4:05 AM  ED Bed Assignment:  14/14    The nursing notes within the ED encounter and vital signs as below have been reviewed. BP (!) 154/107   Pulse 104   Temp 99.3 °F (37.4 °C) (Oral)   Resp 20   Ht 6' 6\" (1.981 m)   Wt (!) 313 lb (142 kg)   SpO2 96%   BMI 36.17 kg/m²   Oxygen Saturation Interpretation: Normal      ------------------------------------------ PROGRESS NOTES ------------------------------------------  I have spoken with the patient and discussed todays results, in addition to providing specific details for the plan of care and counseling regarding the diagnosis and prognosis. Their questions are answered at this time and they are agreeable with the plan. I discussed at length with them reasons for immediate return here for re evaluation. They will followup with primary care by calling their office tomorrow.       --------------------------------- ADDITIONAL PROVIDER NOTES ---------------------------------  At this time the patient is without objective evidence of an acute process requiring hospitalization or inpatient management. They have remained hemodynamically stable throughout their entire ED visit and are stable for discharge with outpatient follow-up. The plan has been discussed in detail and they are aware of the specific conditions for emergent return, as well as the importance of follow-up. New Prescriptions    No medications on file       Diagnosis:  1. Nonintractable headache, unspecified chronicity pattern, unspecified headache type        Disposition:  Patient's disposition: Discharge to home  Patient's condition is stable.               May Abdullahi DO  08/03/21 9399

## 2021-08-25 ENCOUNTER — TELEPHONE (OUTPATIENT)
Dept: PRIMARY CARE CLINIC | Age: 26
End: 2021-08-25

## 2021-08-25 NOTE — TELEPHONE ENCOUNTER
----- Message from Murphy sent at 8/24/2021  3:45 PM EDT -----  Subject: Message to Provider    QUESTIONS  Information for Provider? Pt states he needs his head and body scanned due   to pain on a spot on right side of head.  ---------------------------------------------------------------------------  --------------  CALL BACK INFO  What is the best way for the office to contact you? OK to leave message on   voicemail  Preferred Call Back Phone Number?  [phone]  ---------------------------------------------------------------------------  --------------  SCRIPT ANSWERS  undefined

## 2021-09-14 ENCOUNTER — OFFICE VISIT (OUTPATIENT)
Dept: PRIMARY CARE CLINIC | Age: 26
End: 2021-09-14
Payer: MEDICARE

## 2021-09-14 VITALS
OXYGEN SATURATION: 98 % | BODY MASS INDEX: 36.45 KG/M2 | DIASTOLIC BLOOD PRESSURE: 86 MMHG | WEIGHT: 315 LBS | SYSTOLIC BLOOD PRESSURE: 128 MMHG | TEMPERATURE: 97.6 F | HEIGHT: 78 IN | HEART RATE: 90 BPM

## 2021-09-14 DIAGNOSIS — F84.9 PERVASIVE DEVELOPMENTAL DISORDER: ICD-10-CM

## 2021-09-14 DIAGNOSIS — R73.01 IMPAIRED FASTING GLUCOSE: ICD-10-CM

## 2021-09-14 DIAGNOSIS — E55.9 VITAMIN D INSUFFICIENCY: ICD-10-CM

## 2021-09-14 DIAGNOSIS — F84.0 AUTISM: ICD-10-CM

## 2021-09-14 DIAGNOSIS — F31.9 BIPOLAR DISEASE, CHRONIC (HCC): ICD-10-CM

## 2021-09-14 DIAGNOSIS — I10 ESSENTIAL HYPERTENSION: Primary | ICD-10-CM

## 2021-09-14 PROCEDURE — 99214 OFFICE O/P EST MOD 30 MIN: CPT | Performed by: FAMILY MEDICINE

## 2021-09-14 RX ORDER — RISPERIDONE 2 MG/1
TABLET, FILM COATED ORAL
COMMUNITY
Start: 2021-08-12 | End: 2021-12-08

## 2021-09-14 ASSESSMENT — ENCOUNTER SYMPTOMS
WHEEZING: 0
NAUSEA: 0
BACK PAIN: 0
DIARRHEA: 0
SHORTNESS OF BREATH: 0
CONSTIPATION: 0
COUGH: 0
ABDOMINAL PAIN: 0
VOMITING: 0

## 2021-09-14 NOTE — PROGRESS NOTES
21  [facility] : 1995 Sex: male  Age: 32 y.o. Chief Complaint   Patient presents with    Hypertension     HPI:  32 y.o. male presents today to establish care with a new PCP. Previously seen by Dr. Raul Ortiz. Patient's chart, medical, surgical and medication history all reviewed. Hypertension   The patient presents today for follow up of HTN. The problem is well controlled. Risk factors for coronary artery disease include male, HTN and family Hx. Current treatments include lisinopril (Prinivil). The patient is compliant all of the time. Lifestyle changes the patient has made include none. Today the patient is complaining of none. Patient has a long standing history of autism and bipolar disorder. Difficulty with anxiety. Follows with Dr. Frieda Bernheim for medication management. Just reduced Geodon dose and added Risperdal.  Patient noting more sleepiness. ROS:  Review of Systems   Constitutional: Negative for chills, fatigue and fever. Respiratory: Negative for cough, shortness of breath and wheezing. Cardiovascular: Negative for chest pain and palpitations. Gastrointestinal: Negative for abdominal pain, constipation, diarrhea, nausea and vomiting. Musculoskeletal: Negative for arthralgias and back pain. Skin: Negative for rash. Neurological: Negative for dizziness and headaches. Psychiatric/Behavioral: Positive for behavioral problems and sleep disturbance. Negative for dysphoric mood. The patient is nervous/anxious. All other systems reviewed and are negative. Current Outpatient Medications on File Prior to Visit   Medication Sig Dispense Refill    risperiDONE (RISPERDAL) 2 MG tablet       vitamin D (ERGOCALCIFEROL) 1.25 MG (39845 UT) CAPS capsule Take 1 capsule by mouth once a week 12 capsule 1    triamcinolone (KENALOG) 0.1 % cream Apply topically 2 times daily Apply topically 2 times daily.       lamoTRIgine (LAMICTAL) 200 MG tablet Take 200 mg by mouth daily Take 1 and a half tablets by mouth at bedtime and one half tablet at noon.  dexmethylphenidate (FOCALIN) 10 MG tablet Take 10 mg by mouth 3 times daily. Take 2 tablets by mouth in the morning, 2 tablets at noon, and 1 tablet at 3pm      Multiple Vitamin (MULTI-VITAMIN DAILY PO) Take by mouth      lisinopril (PRINIVIL;ZESTRIL) 10 MG tablet Take 1 tablet by mouth daily 90 tablet 1    ziprasidone (GEODON) 20 MG capsule Take 20 mg by mouth 2 times daily (with meals)        No current facility-administered medications on file prior to visit. No Known Allergies    Past Medical History:   Diagnosis Date    Autism     Bipolar 1 disorder ([facility] [address]     Essential hypertension 5/4/2021    [facility]ures ([email or web address]      History reviewed. No pertinent surgical history. History reviewed. No pertinent family history. Social History     Socioeconomic History    Marital status: Single     Spouse name: Not on file    Number of children: Not on file    Years of education: Not on file    Highest education level: Not on file   Occupational History    Not on file   Tobacco Use    Smoking status: Never Smoker    Smokeless tobacco: Never Used   Substance and Sexual Activity    Alcohol use: Not Currently    Drug use: Not Currently    Sexual activity: Not Currently   Other Topics Concern    Not on file   Social History Narrative    Not on file     Social Determinants of Health     Financial Resource Strain:     Difficulty of Paying Living Expenses:    Food Insecurity:     Worried About Running Out of Food in the Last Year:     [address] in the Last Year:    Transportation Needs:     Lack of Transportation (Medical):      Lack of Transportation (Non-Medical):    Physical Activity:     Days of Exercise per Week:     Minutes of Exercise per Session:    Stress:     Feeling of Stress :    Social Connections:     Frequency of Communication with Friends and Family:     Frequency of Social Gatherings with Friends and Family:     Attends Nondenominational Services:     Active Member of Clubs or Organizations:     Attends Club or Organization Meetings:     Marital Status:    Intimate Partner Violence:     Fear of Current or Ex-Partner:     Emotionally Abused:     Physically Abused:     Sexually Abused:        Vitals:    09/14/21 1049   BP: 128/86   Pulse: 90   Temp: 97.6 °F (36.4 °C)   SpO2: 98%   Weight: (!) 318 lb (144.2 kg)   Height: 6' 6\" (1.981 m)       Physical Exam:  Physical Exam  Vitals and nursing note reviewed. Constitutional:       General: He is not in acute distress. Appearance: Normal appearance. He is well-developed. HENT:      Head: Normocephalic and atraumatic. Right Ear: Hearing and external ear normal.      Left Ear: Hearing and external ear normal.      Nose:      Comments: Wearing mask  Eyes:      General: Lids are normal. No scleral icterus. Extraocular Movements: Extraocular movements intact. Conjunctiva/sclera: Conjunctivae normal.   Neck:      Thyroid: No thyromegaly. Cardiovascular:      Rate and Rhythm: Normal rate and regular rhythm. Heart sounds: Normal heart sounds. No murmur heard. Pulmonary:      Effort: Pulmonary effort is normal. No respiratory distress. Breath sounds: Normal breath sounds. No wheezing. Musculoskeletal:         General: No tenderness or deformity. Normal range of motion. Cervical back: Normal range of motion and neck supple. Right lower leg: No edema. Left lower leg: No edema. Lymphadenopathy:      Cervical: No cervical adenopathy. Skin:     General: Skin is warm and dry. Findings: No rash. Neurological:      General: No focal deficit present. Mental Status: He is alert and oriented to person, place, and time. Gait: Gait normal.   Psychiatric:         Mood and Affect: Affect normal. Mood is anxious. Speech: Speech is rapid and pressured.          Behavior: Behavior normal. Thought Content: Thought content normal.         Labs:  CBC with Differential:    Lab Results   Component Value Date    WBC 6.0 05/05/2021    RBC 5.15 05/05/2021    HGB 14.7 05/05/2021    HCT 44.6 05/05/2021     05/05/2021    MCV 86.6 05/05/2021    MCH 28.5 05/05/2021    MCHC 33.0 05/05/2021    RDW 13.2 05/05/2021    LYMPHOPCT 30.0 05/05/2021    MONOPCT 9.8 05/05/2021    BASOPCT 0.5 05/05/2021    MONOSABS 0.59 05/05/2021    LYMPHSABS 1.81 05/05/2021    EOSABS 0.17 05/05/2021    BASOSABS 0.03 05/05/2021     CMP:    Lab Results   Component Value Date     05/05/2021    K 4.4 05/05/2021     05/05/2021    CO2 26 05/05/2021    BUN 10 05/05/2021    CREATININE 0.8 05/05/2021    GFRAA >60 05/05/2021    LABGLOM >60 05/05/2021    GLUCOSE 98 05/05/2021    PROT 7.9 05/05/2021    LABALBU 4.4 05/05/2021    CALCIUM 10.0 05/05/2021    BILITOT 0.5 05/05/2021    ALKPHOS 85 05/05/2021    AST 28 05/05/2021    ALT 35 05/05/2021     U/A:    Lab Results   Component Value Date    COLORU Yellow 05/05/2021    PROTEINU Negative 05/05/2021    PHUR 6.5 05/05/2021    CLARITYU Clear 05/05/2021    SPECGRAV 1.025 05/05/2021    LEUKOCYTESUR Negative 05/05/2021    UROBILINOGEN 1.0 05/05/2021    BILIRUBINUR Negative 05/05/2021    BLOODU Negative 05/05/2021    GLUCOSEU Negative 05/05/2021     HgBA1c:    Lab Results   Component Value Date    LABA1C 5.4 05/05/2021     FLP:    Lab Results   Component Value Date    TRIG 96 05/05/2021    HDL 55 05/05/2021    LDLCALC 137 05/05/2021    LABVLDL 19 05/05/2021     TSH:    Lab Results   Component Value Date    TSH 1.810 05/05/2021        Assessment and Plan:  Francoise Doan was seen today for hypertension. Diagnoses and all orders for this visit:    Essential hypertension  -     CBC Auto Differential; Future  -     Comprehensive Metabolic Panel; Future  -     Lipid Panel; Future  -     TSH without Reflex; Future  -     Urinalysis; Future  -     Uric Acid;  Future  -     Vitamin B12 & Folate; Future  Well controlled. Due for fasting labs. Autism    Pervasive developmental disorder    Bipolar disease, chronic (HCC)  Psych adjusting medications. Discussed sleep changes. Impaired fasting glucose  -     Hemoglobin A1C; Future    Vitamin D insufficiency  -     Vitamin D 25 Hydroxy; Future        Return in about 6 months (around 3/14/2022) for Well visit.       Seen By:  Gely Austin DO [id number]

## 2021-09-29 ENCOUNTER — TELEPHONE (OUTPATIENT)
Dept: PRIMARY CARE CLINIC | Age: 26
End: 2021-09-29

## 2021-09-29 NOTE — TELEPHONE ENCOUNTER
I would recommend Ibuprofen/Tylenol and re-wetting drops for his eyes.   Systane is the best OTC brand for this

## 2021-09-29 NOTE — TELEPHONE ENCOUNTER
----- Message from [employer] sent at 9/29/2021 11:02 AM EDT -----  Subject: Appointment Request    Reason for Call: Semi-Routine Headache    QUESTIONS  Type of Appointment? Established Patient  Reason for appointment request? No appointments available during search  Additional Information for Provider? Patient has been having Tension   headaches, and red veins in his eyes. Haven't affected his vision or   anything but he can visibly see them.   ---------------------------------------------------------------------------  --------------  CALL BACK INFO  What is the best way for the office to contact you? OK to leave message on   voicemail  Preferred Call Back Phone Number? [phone]  ---------------------------------------------------------------------------  --------------  SCRIPT ANSWERS  Relationship to Patient? Self  Would you describe this as the worst headache of your life? No  Are you having any changes in your vision? No  Are you having weakness on one side of your body, drooping on one side of   your face or difficult speaking? No  Are you experiencing any confusion? No  Have you had any recent head injuries or falls? No  Do you have any vomiting? No  Are your headaches different than your usual headaches? No  Has there been a progression in the severity of your headaches? No  Has there been a change in pattern to your headache? No  Are you having fevers (100.4), chills or sweats? No  Have you recently (14 days) seen a provider for this issue? No  Have you been diagnosed with, awaiting test results for, or told that you   are suspected of having COVID-19 (Coronavirus)? (If patient has tested   negative or was tested as a requirement for work, school, or travel and   not based on symptoms, answer no)? No  Within the past two weeks have you developed any of the following symptoms   (answer no if symptoms have been present longer than 2 weeks or began   more than 2 weeks ago)?  Fever or Chills, Cough, Shortness of breath or   difficulty breathing, Loss of taste or smell, Sore throat, Nasal   congestion, Sneezing or runny nose, Fatigue or generalized body aches   (answer no if pain is specific to a body part e.g. back pain), Diarrhea,   Headache?  Yes oral

## 2021-11-11 ENCOUNTER — TELEPHONE (OUTPATIENT)
Dept: PRIMARY CARE CLINIC | Age: 26
End: 2021-11-11

## 2021-11-11 NOTE — TELEPHONE ENCOUNTER
Patient states he is using the eye drops prn per the directions on the box. His parents are his ride to appts states he will schedule an eye appt when they return from out of town.

## 2021-11-11 NOTE — TELEPHONE ENCOUNTER
----- Message from Bettyeurszula Lagunas sent at 11/11/2021  3:42 PM EST -----  Subject: Appointment Request    Reason for Call: Routine (Patient Request) No Script    QUESTIONS  Type of Appointment? Established Patient  Reason for appointment request? No appointments available during search  Additional Information for Provider? patient is still having the burning   and red veins appearing in eyes and needs to get an appt soon. ---------------------------------------------------------------------------  --------------  Isatu CAPUTO  What is the best way for the office to contact you? OK to leave message on   voicemail  Preferred Call Back Phone Number? [phone]  ---------------------------------------------------------------------------  --------------  SCRIPT ANSWERS  Relationship to Patient? Self  (Is the patient requesting to see the provider for a procedure?)? No  (Is the patient requesting to see the provider urgently  today or   tomorrow. )? No  Have you been diagnosed with, awaiting test results for, or told that you   are suspected of having COVID-19 (Coronavirus)? (If patient has tested   negative or was tested as a requirement for work, school, or travel and   not based on symptoms, answer no)? No  Within the past two weeks have you developed any of the following symptoms   (answer no if symptoms have been present longer than 2 weeks or began   more than 2 weeks ago)? Fever or Chills, Cough, Shortness of breath or   difficulty breathing, Loss of taste or smell, Sore throat, Nasal   congestion, Sneezing or runny nose, Fatigue or generalized body aches   (answer no if pain is specific to a body part e.g. back pain), Diarrhea,   Headache?  Yes

## 2021-11-29 RX ORDER — ERGOCALCIFEROL 1.25 MG/1
CAPSULE ORAL
Qty: 12 CAPSULE | Refills: 0 | Status: SHIPPED
Start: 2021-11-29 | End: 2022-08-25 | Stop reason: SDUPTHER

## 2021-12-01 ENCOUNTER — TELEPHONE (OUTPATIENT)
Dept: PRIMARY CARE CLINIC | Age: 26
End: 2021-12-01

## 2021-12-01 RX ORDER — HYDROCORTISONE ACETATE 25 MG/1
25 SUPPOSITORY RECTAL EVERY 12 HOURS
Qty: 28 SUPPOSITORY | Refills: 1 | Status: SHIPPED | OUTPATIENT
Start: 2021-12-01 | End: 2021-12-15

## 2021-12-01 NOTE — TELEPHONE ENCOUNTER
----- Message from Judy Lomas sent at 11/30/2021  4:23 PM EST -----  Subject: Appointment Request    Reason for Call: Urgent (Patient Request) No Script    QUESTIONS  Type of Appointment? Established Patient  Reason for appointment request? Available appointments did not meet   patient need  Additional Information for Provider? Said he's having trouble with   hemorrhoids. We tried to schedule appt but none available till January. He   said he has old suppositories from the past that he wants to try to use to   help but didn't want until he got the okay from the doctor to use or what   he should do.   ---------------------------------------------------------------------------  --------------  [address]  What is the best way for the office to contact you? OK to leave message on   voicemail  Preferred Call Back Phone Number? [phone]  ---------------------------------------------------------------------------  --------------  SCRIPT ANSWERS  Relationship to Patient? Self  (Is the patient requesting to see the provider for a procedure?)? No  (Is the patient requesting to see the provider urgently  today or   tomorrow. )? Yes  Have you been diagnosed with, awaiting test results for, or told that you   are suspected of having COVID-19 (Coronavirus)? (If patient has tested   negative or was tested as a requirement for work, school, or travel and   not based on symptoms, answer no)? No  Within the past two weeks have you developed any of the following symptoms   (answer no if symptoms have been present longer than 2 weeks or began   more than 2 weeks ago)? Fever or Chills, Cough, Shortness of breath or   difficulty breathing, Loss of taste or smell, Sore throat, Nasal   congestion, Sneezing or runny nose, Fatigue or generalized body aches   (answer no if pain is specific to a body part e.g. back pain), Diarrhea,   Headache? No  Have you had close contact with someone with COVID-19 in the last 14 days? No  (Service Expert  click yes below to proceed with [employer] As Usual   Scheduling)?  Yes

## 2021-12-03 ENCOUNTER — NURSE TRIAGE (OUTPATIENT)
Dept: OTHER | Facility: CLINIC | Age: 26
End: 2021-12-03

## 2021-12-03 ENCOUNTER — TELEPHONE (OUTPATIENT)
Dept: PRIMARY CARE CLINIC | Age: 26
End: 2021-12-03

## 2021-12-03 DIAGNOSIS — I10 ESSENTIAL HYPERTENSION: ICD-10-CM

## 2021-12-03 RX ORDER — LISINOPRIL 10 MG/1
10 TABLET ORAL DAILY
Qty: 90 TABLET | Refills: 1 | Status: SHIPPED
Start: 2021-12-03 | End: 2022-06-06 | Stop reason: SDUPTHER

## 2021-12-03 NOTE — TELEPHONE ENCOUNTER
Received call from [facility]  at [facility] with [employer] Complaint. Brief description of triage:   31 y/o  With   Blood in stool   Today  He noticed it twice  , pt state she has a  Lump in  The rectum   Pt reports he took a suppository   Pt state she had a bag of hot chips     Not sleeping         Triage indicates for patient to seen in the next  3 days     Care advice provided, patient verbalizes understanding; denies any other questions or concerns; instructed to call back for any new or worsening symptoms. Writer provided warm transfer to Fabio Bear  at [facility] for appointment scheduling. Attention Provider: Thank you for allowing me to participate in the care of your patient. The patient was connected to triage in response to information provided to the ECC/PSC. Please do not respond through this encounter as the response is not directed to a shared pool. Reason for Disposition   MILD rectal bleeding (more than just a few drops or streaks)    Answer Assessment - Initial Assessment Questions  1. APPEARANCE of BLOOD: \"What color is it? \" \"Is it passed separately, on the surface of the stool, or mixed in with the stool? \"       Blood  After  A bowel movement     2. AMOUNT: \"How much blood was passed? \"       Pt states is unable to  Describe     3. FREQUENCY: \"How many times has blood been passed with the stools? \"      Twice       4. ONSET: \"When was the blood first seen in the stools? \" (Days or weeks)       Today     5. DIARRHEA: \"Is there also some diarrhea? \" If so, ask: \"How many diarrhea stools were passed in past 24 hours? \"       No     6. CONSTIPATION: \"Do you have constipation? \" If so, \"How bad is it? \"      Denies     7. RECURRENT SYMPTOMS: \"Have you had blood in your stools before? \" If so, ask: \"When was the last time? \" and \"What happened that time? \"       Pt  State yes     8. BLOOD THINNERS: \"Do you take any blood thinners? \" (e.g., Coumadin/warfarin, Pradaxa/dabigatran, aspirin)      Denies     9. OTHER SYMPTOMS: \"Do you have any other symptoms? \"  (e.g., abdominal pain, vomiting, dizziness, fever)      Some pain with sitting     10. PREGNANCY: \"Is there any chance you are pregnant? \" \"When was your last menstrual period? \"       N/a    Protocols used: RECTAL BLEEDING-ADULT-AH

## 2021-12-03 NOTE — TELEPHONE ENCOUNTER
----- Message from Meghnaelly Lane sent at 12/3/2021  7:29 AM EST -----  Subject: Appointment Request    Reason for Call: Semi-Urgent Return from RN Triage    QUESTIONS  Type of Appointment? Established Patient  Reason for appointment request? No appointments available during search  Additional Information for Provider? RTN from nurse triage , blood in the   stool , needs to be seen 3 days , screened red with headache on monday   claims that its now a chronic issue , was told to follow up with the   issues , call to schedule  ---------------------------------------------------------------------------  --------------  CALL BACK INFO  What is the best way for the office to contact you? OK to leave message on   voicemail  Preferred Call Back Phone Number? [phone]  ---------------------------------------------------------------------------  --------------  SCRIPT ANSWERS  Patient needs to be seen within 3 days? Yes   Nurse Name? Maribel  Have you been diagnosed with, awaiting test results for, or told that you   are suspected of having COVID-19 (Coronavirus)? (If patient has tested   negative or was tested as a requirement for work, school, or travel and   not based on symptoms, answer no)? Yes  Did your symptoms begin within the past 14 days or was your positive test   result within the past 14 days? No  Within the past two weeks have you developed any of the following symptoms   (answer no if symptoms have been present longer than 2 weeks or began   more than 2 weeks ago)? Fever or Chills, Cough, Shortness of breath or   difficulty breathing, Loss of taste or smell, Sore throat, Nasal   congestion, Sneezing or runny nose, Fatigue or generalized body aches   (answer no if pain is specific to a body part e.g. back pain), Diarrhea,   Headache?  Yes

## 2021-12-03 NOTE — TELEPHONE ENCOUNTER
I can see him on Wednesday at 11 AM, but based on his last TE, he is bleeding because of hemorrhoids. He needs to use the suppositories 2x/day for 1 week before the hemorrhoid will go away.   Not just once

## 2021-12-08 ENCOUNTER — OFFICE VISIT (OUTPATIENT)
Dept: PRIMARY CARE CLINIC | Age: 26
End: 2021-12-08
Payer: MEDICARE

## 2021-12-08 VITALS
DIASTOLIC BLOOD PRESSURE: 90 MMHG | SYSTOLIC BLOOD PRESSURE: 134 MMHG | WEIGHT: 315 LBS | TEMPERATURE: 97.4 F | HEART RATE: 74 BPM | BODY MASS INDEX: 36.45 KG/M2 | OXYGEN SATURATION: 97 % | HEIGHT: 78 IN

## 2021-12-08 DIAGNOSIS — K64.9 HEMORRHOIDS, UNSPECIFIED HEMORRHOID TYPE: Primary | ICD-10-CM

## 2021-12-08 DIAGNOSIS — E66.01 SEVERE OBESITY (BMI 35.0-35.9 WITH COMORBIDITY) (HCC): ICD-10-CM

## 2021-12-08 DIAGNOSIS — K92.1 MELENA: ICD-10-CM

## 2021-12-08 PROCEDURE — G8427 DOCREV CUR MEDS BY ELIG CLIN: HCPCS | Performed by: FAMILY MEDICINE

## 2021-12-08 PROCEDURE — G8417 CALC BMI ABV UP PARAM F/U: HCPCS | Performed by: FAMILY MEDICINE

## 2021-12-08 PROCEDURE — G8484 FLU IMMUNIZE NO ADMIN: HCPCS | Performed by: FAMILY MEDICINE

## 2021-12-08 PROCEDURE — 99214 OFFICE O/P EST MOD 30 MIN: CPT | Performed by: FAMILY MEDICINE

## 2021-12-08 PROCEDURE — 1036F TOBACCO NON-USER: CPT | Performed by: FAMILY MEDICINE

## 2021-12-08 RX ORDER — ZIPRASIDONE HYDROCHLORIDE 60 MG/1
CAPSULE ORAL
COMMUNITY
Start: 2021-10-07

## 2021-12-08 RX ORDER — CLONIDINE HYDROCHLORIDE 0.2 MG/1
TABLET ORAL
COMMUNITY
Start: 2021-11-16 | End: 2022-03-15

## 2021-12-08 ASSESSMENT — ENCOUNTER SYMPTOMS
WHEEZING: 0
DIARRHEA: 0
VOMITING: 0
BACK PAIN: 0
NAUSEA: 0
CONSTIPATION: 0
ABDOMINAL PAIN: 0
ANAL BLEEDING: 1
SHORTNESS OF BREATH: 0
COUGH: 0

## 2021-12-08 NOTE — PROGRESS NOTES
21  [facility] : 1995 Sex: male  Age: 32 y.o. Chief Complaint   Patient presents with    Rectal Bleeding    Fatigue    Anorexia     HPI:  32 y.o. male presents for acute visit due to concerns of rectal bleeding. Hemorrhoids: Patient complains of evaluation of rectal bleeding. Onset of symptoms was gradual.  He describes symptoms as bleeding which only occurs with bowel movements and constipation. Treatment to date has been none. Patient denies family hx of colorectal CA, history of previous STDs, known or suspected STD exposure, maroon colored stools, receptive anal intercourse and weight loss. ROS:  Review of Systems   Constitutional: Negative for chills, fatigue and fever. Respiratory: Negative for cough, shortness of breath and wheezing. Cardiovascular: Negative for chest pain and palpitations. Gastrointestinal: Positive for anal bleeding. Negative for abdominal pain, constipation, diarrhea, nausea and vomiting. Musculoskeletal: Negative for arthralgias and back pain. Skin: Negative for rash. Neurological: Negative for dizziness and headaches. Psychiatric/Behavioral: Positive for behavioral problems and sleep disturbance. Negative for dysphoric mood. The patient is nervous/anxious. All other systems reviewed and are negative. Current Outpatient Medications on File Prior to Visit   Medication Sig Dispense Refill    ziprasidone (GEODON) 60 MG capsule TAKE TWO CAPSULES BY MOUTH TWO TIMES A DAY      lisinopril (PRINIVIL;ZESTRIL) 10 MG tablet Take 1 tablet by mouth daily 90 tablet 1    hydrocortisone (ANUSOL-HC) 25 MG suppository Place 1 suppository rectally every 12 hours for 14 days 28 suppository 1    vitamin D (ERGOCALCIFEROL) 1.25 MG (38005 UT) CAPS capsule TAKE ONE CAPSULE BY MOUTH ONCE A WEEK 12 capsule 0    triamcinolone (KENALOG) 0.1 % cream Apply topically 2 times daily Apply topically 2 times daily.       lamoTRIgine (LAMICTAL) 200 MG tablet Take 200 mg by mouth daily Take 1 and a half tablets by mouth at bedtime and one half tablet at noon.  dexmethylphenidate (FOCALIN) 10 MG tablet Take 10 mg by mouth 3 times daily. Take 2 tablets by mouth in the morning, 2 tablets at noon, and 1 tablet at 3pm      Multiple Vitamin (MULTI-VITAMIN DAILY PO) Take by mouth      cloNIDine (CATAPRES) 0.2 MG tablet TAKE ONE TABLET BY MOUTH AT BEDTIME       No current facility-administered medications on file prior to visit. No Known Allergies    Past Medical History:   Diagnosis Date    Autism     Bipolar 1 disorder ([facility]     Essential hypertension 5/4/2021    Seizures ([facility]      History reviewed. No pertinent surgical history. History reviewed. No pertinent family history. Social History     Tobacco History     Smoking Status  Never Smoker    Smokeless Tobacco Use  Never Used                 Vitals:    12/08/21 1109   BP: (!) 134/90   Pulse: 74   Temp: 97.4 °F (36.3 °C)   SpO2: 97%   Weight: (!) 323 lb (146.5 kg)   Height: 6' 6.5\" (1.994 m)       Physical Exam:  Physical Exam  Vitals and nursing note reviewed. Constitutional:       General: He is not in acute distress. Appearance: Normal appearance. He is well-developed. HENT:      Head: Normocephalic and atraumatic. Right Ear: Hearing and external ear normal.      Left Ear: Hearing and external ear normal.      Nose:      Comments: Wearing mask  Eyes:      General: Lids are normal. No scleral icterus. Extraocular Movements: Extraocular movements intact. Conjunctiva/sclera: Conjunctivae normal.   Neck:      Thyroid: No thyromegaly. Cardiovascular:      Rate and Rhythm: Normal rate and regular rhythm. Heart sounds: Normal heart sounds. No murmur heard. Pulmonary:      Effort: Pulmonary effort is normal. No respiratory distress. Breath sounds: Normal breath sounds. No wheezing. Musculoskeletal:         General: No tenderness or deformity. Normal range of motion. Cervical back: Normal range of motion and neck supple. Right lower leg: No edema. Left lower leg: No edema. Lymphadenopathy:      Cervical: No cervical adenopathy. Skin:     General: Skin is warm and dry. Findings: No rash. Neurological:      General: No focal deficit present. Mental Status: He is alert and oriented to person, place, and time. Gait: Gait normal.   Psychiatric:         Mood and Affect: Affect normal. Mood is anxious. Speech: Speech is rapid and pressured. Behavior: Behavior normal.         Thought Content: Thought content normal.          Assessment and Plan:  Prince Harden was seen today for rectal bleeding, fatigue and anorexia. Diagnoses and all orders for this visit:    Hemorrhoids, unspecified hemorrhoid type    Melena  -     POCT Fit Test; Future    Severe obesity (BMI 35.0-35.9 with comorbidity) ([facility]    Discussed pathophysiology of hemorrhoids. They can cause bleeding without \"popping\". Patient states the \"lump\" is still present, which is why he didn't think the hemorrhoid was causing bleeding. Will check FIT test to be sure. Return if symptoms worsen or fail to improve.       Seen By:  Lisbeth Oglesby,

## 2021-12-10 DIAGNOSIS — K92.1 MELENA: ICD-10-CM

## 2021-12-10 LAB
CONTROL: NORMAL
HEMOCCULT STL QL: POSITIVE

## 2021-12-10 PROCEDURE — 82274 ASSAY TEST FOR BLOOD FECAL: CPT | Performed by: FAMILY MEDICINE

## 2021-12-13 DIAGNOSIS — R19.5 POSITIVE FIT (FECAL IMMUNOCHEMICAL TEST): Primary | ICD-10-CM

## 2021-12-14 ENCOUNTER — TELEPHONE (OUTPATIENT)
Dept: SURGERY | Age: 26
End: 2021-12-14

## 2022-01-04 ENCOUNTER — TELEPHONE (OUTPATIENT)
Dept: PRIMARY CARE CLINIC | Age: 27
End: 2022-01-04

## 2022-01-04 NOTE — TELEPHONE ENCOUNTER
----- Message from Srini Martínez sent at 1/4/2022  3:40 PM EST -----  Subject: Message to Provider    QUESTIONS  Information for Provider? Pt is wanting to know if he can get something   prescribed so he will not urinate as much. No blood in urine. He is not   eating or drinking that much. Pharmacy? [address] [place] [address].   ---------------------------------------------------------------------------  --------------  Holly CAPUTO  What is the best way for the office to contact you? OK to leave message on   voicemail  Preferred Call Back Phone Number? [phone]  ---------------------------------------------------------------------------  --------------  SCRIPT ANSWERS  Relationship to Patient?  Self

## 2022-01-04 NOTE — TELEPHONE ENCOUNTER
Spoke with patient. He is not experiencing any symptoms other than increased urination. He thinks he should not be drinking urinating as much as he is because he doesn't think he is drinking that much liquid. Advised that there is nothing that can be prescribed to make him urinate less. Verified that pt is not experiencing any other symptoms. Pt verbalized understanding.

## 2022-01-05 ENCOUNTER — TELEPHONE (OUTPATIENT)
Dept: SURGERY | Age: 27
End: 2022-01-05

## 2022-01-05 NOTE — TELEPHONE ENCOUNTER
MA called patient to reschedule his appointment. Was told that three of his close family members tested positive for covid. He wants to hold off on rescheduling till he gets himself tested. Also for further reference he states that he would like us to call him after 1:00pm so we do not disturb his sleep.      Electronically signed by Chuck Goldman on 1/5/2022 at 8:10 AM

## 2022-02-07 ENCOUNTER — TELEPHONE (OUTPATIENT)
Dept: PRIMARY CARE CLINIC | Age: 27
End: 2022-02-07

## 2022-02-07 NOTE — TELEPHONE ENCOUNTER
----- Message from Unique Steward sent at 2/7/2022  3:28 PM EST -----  Subject: Appointment Request    Reason for Call: Semi-Routine Skin Problem    QUESTIONS  Type of Appointment? Established Patient  Reason for appointment request? Available appointments did not meet   patient need  Additional Information for Provider? pt has rash on his hands pt thought   it was just dry skin but now has red lines in the finger   ---------------------------------------------------------------------------  --------------  CALL BACK INFO  What is the best way for the office to contact you? OK to leave message on   voicemail  Preferred Call Back Phone Number? [phone]  ---------------------------------------------------------------------------  --------------  SCRIPT ANSWERS  Relationship to Patient? Self  Are you having swelling in your throat or face? No  Are you having difficulty breathing? No  Have the symptoms worsened or spread in the last day? No  Are you having fevers (100.4), chills or sweats? No  Have you recently (14 days) seen a provider for this issue? No  Have you been diagnosed with, awaiting test results for, or told that you   are suspected of having COVID-19 (Coronavirus)? (If patient has tested   negative or was tested as a requirement for work, school, or travel and   not based on symptoms, answer no)? No  Within the past two weeks have you developed any of the following symptoms   (answer no if symptoms have been present longer than 2 weeks or began   more than 2 weeks ago)? Fever or Chills, Cough, Shortness of breath or   difficulty breathing, Loss of taste or smell, Sore throat, Nasal   congestion, Sneezing or runny nose, Fatigue or generalized body aches   (answer no if pain is specific to a body part e.g. back pain), Diarrhea,   Headache? No  Have you had close contact with someone with COVID-19 in the last 14 days?    No  (Service Expert  click yes below to proceed with [employer] As Usual Scheduling)?  Yes

## 2022-02-10 ENCOUNTER — OFFICE VISIT (OUTPATIENT)
Dept: PRIMARY CARE CLINIC | Age: 27
End: 2022-02-10
Payer: MEDICARE

## 2022-02-10 ENCOUNTER — OFFICE VISIT (OUTPATIENT)
Dept: SURGERY | Age: 27
End: 2022-02-10
Payer: MEDICARE

## 2022-02-10 VITALS
HEIGHT: 78 IN | DIASTOLIC BLOOD PRESSURE: 76 MMHG | OXYGEN SATURATION: 98 % | BODY MASS INDEX: 36.45 KG/M2 | TEMPERATURE: 97 F | HEART RATE: 88 BPM | RESPIRATION RATE: 18 BRPM | WEIGHT: 315 LBS | SYSTOLIC BLOOD PRESSURE: 138 MMHG

## 2022-02-10 VITALS
WEIGHT: 314 LBS | BODY MASS INDEX: 36.33 KG/M2 | DIASTOLIC BLOOD PRESSURE: 84 MMHG | HEIGHT: 78 IN | HEART RATE: 100 BPM | OXYGEN SATURATION: 98 % | SYSTOLIC BLOOD PRESSURE: 124 MMHG

## 2022-02-10 DIAGNOSIS — F31.9 BIPOLAR DISEASE, CHRONIC (HCC): ICD-10-CM

## 2022-02-10 DIAGNOSIS — I10 ESSENTIAL HYPERTENSION: ICD-10-CM

## 2022-02-10 DIAGNOSIS — K62.5 RECTAL BLEEDING: Primary | ICD-10-CM

## 2022-02-10 DIAGNOSIS — L20.84 INTRINSIC ECZEMA: Primary | ICD-10-CM

## 2022-02-10 DIAGNOSIS — E66.01 SEVERE OBESITY (BMI 35.0-39.9) WITH COMORBIDITY (HCC): ICD-10-CM

## 2022-02-10 PROCEDURE — 99214 OFFICE O/P EST MOD 30 MIN: CPT | Performed by: FAMILY MEDICINE

## 2022-02-10 PROCEDURE — G8427 DOCREV CUR MEDS BY ELIG CLIN: HCPCS | Performed by: SURGERY

## 2022-02-10 PROCEDURE — 1036F TOBACCO NON-USER: CPT | Performed by: FAMILY MEDICINE

## 2022-02-10 PROCEDURE — 1036F TOBACCO NON-USER: CPT | Performed by: SURGERY

## 2022-02-10 PROCEDURE — G8484 FLU IMMUNIZE NO ADMIN: HCPCS | Performed by: SURGERY

## 2022-02-10 PROCEDURE — G8484 FLU IMMUNIZE NO ADMIN: HCPCS | Performed by: FAMILY MEDICINE

## 2022-02-10 PROCEDURE — G8427 DOCREV CUR MEDS BY ELIG CLIN: HCPCS | Performed by: FAMILY MEDICINE

## 2022-02-10 PROCEDURE — G8417 CALC BMI ABV UP PARAM F/U: HCPCS | Performed by: FAMILY MEDICINE

## 2022-02-10 PROCEDURE — 99203 OFFICE O/P NEW LOW 30 MIN: CPT | Performed by: SURGERY

## 2022-02-10 PROCEDURE — G8417 CALC BMI ABV UP PARAM F/U: HCPCS | Performed by: SURGERY

## 2022-02-10 RX ORDER — CLOBETASOL PROPIONATE 0.5 MG/G
CREAM TOPICAL
Qty: 60 G | Refills: 1 | Status: SHIPPED | OUTPATIENT
Start: 2022-02-10

## 2022-02-10 ASSESSMENT — ENCOUNTER SYMPTOMS
ABDOMINAL PAIN: 0
VOMITING: 0
NAUSEA: 0
WHEEZING: 0
BACK PAIN: 0
SHORTNESS OF BREATH: 0
DIARRHEA: 0
CONSTIPATION: 0
COUGH: 0

## 2022-02-10 NOTE — PROGRESS NOTES
2/10/22  Santhosh Alvarado : 1995 Sex: male  Age: 32 y.o. Chief Complaint   Patient presents with    Rash     on hands has been there for a while     Other     temp goes up to  when he is sitting around      HPI:  32 y.o. male presents today for follow up of chronic medical conditions. Patient's chart, medical, surgical and medication history all reviewed. Hypertension   The patient presents today for follow up of HTN. The problem is well controlled. Risk factors for coronary artery disease include male, HTN and family Hx. Current treatments include lisinopril (Prinivil). The patient is compliant all of the time. Lifestyle changes the patient has made include none. Today the patient is complaining of none. Patient has a long standing history of autism and bipolar disorder. Difficulty with anxiety. Follows with Dr. Fink  for medication management. Reduced Geodon dose and stopped Risperdal.  Patient noting more sleepiness. Patient now having rash on bilateral hands and upper extremities. Works as a  at [employer]. Uses several chemicals and washing hands frequently due to chemicals. Also wearing gloves. ROS:  Review of Systems   Constitutional: Negative for chills, fatigue and fever. Respiratory: Negative for cough, shortness of breath and wheezing. Cardiovascular: Negative for chest pain and palpitations. Gastrointestinal: Negative for abdominal pain, constipation, diarrhea, nausea and vomiting. Musculoskeletal: Negative for arthralgias and back pain. Skin: Positive for rash. Neurological: Negative for dizziness and headaches. Psychiatric/Behavioral: Positive for behavioral problems and sleep disturbance. Negative for dysphoric mood. The patient is nervous/anxious. All other systems reviewed and are negative.      Current Outpatient Medications on File Prior to Visit   Medication Sig Dispense Refill    cloNIDine (CATAPRES) 0.2 MG tablet TAKE ONE TABLET BY MOUTH AT BEDTIME      ziprasidone (GEODON) 60 MG capsule TAKE TWO CAPSULES BY MOUTH TWO TIMES A DAY      lisinopril (PRINIVIL;ZESTRIL) 10 MG tablet Take 1 tablet by mouth daily 90 tablet 1    vitamin D (ERGOCALCIFEROL) 1.25 MG (00337 UT) CAPS capsule TAKE ONE CAPSULE BY MOUTH ONCE A WEEK 12 capsule 0    triamcinolone (KENALOG) 0.1 % cream Apply topically 2 times daily Apply topically 2 times daily.  lamoTRIgine (LAMICTAL) 200 MG tablet Take 200 mg by mouth daily Take 1 and a half tablets by mouth at bedtime and one half tablet at noon.  dexmethylphenidate (FOCALIN) 10 MG tablet Take 10 mg by mouth 3 times daily. Take 2 tablets by mouth in the morning, 2 tablets at noon, and 1 tablet at 3pm      Multiple Vitamin (MULTI-VITAMIN DAILY PO) Take by mouth       No current facility-administered medications on file prior to visit. No Known Allergies    Past Medical History:   Diagnosis Date    Autism     Bipolar 1 disorder ([facility]     Essential hypertension 5/4/2021    Seizures ([facility]      History reviewed. No pertinent surgical history. History reviewed. No pertinent family history.   Social History     Socioeconomic History    Marital status: Single     Spouse name: Not on file    Number of children: Not on file    Years of education: Not on file    Highest education level: Not on file   Occupational History    Not on file   Tobacco Use    Smoking status: Never Smoker    Smokeless tobacco: Never Used   Substance and Sexual Activity    Alcohol use: Not Currently    Drug use: Not Currently    Sexual activity: Not Currently   Other Topics Concern    Not on file   Social History Narrative    Not on file     Social Determinants of Health     Financial Resource Strain:     Difficulty of Paying Living Expenses: Not on file   Food Insecurity:     Worried About Running Out of Food in the Last Year: Not on file    Chao of Food in the Last Year: Not on LOUISE Tracy Needs:     Lack of Transportation (Medical): Not on file    Lack of Transportation (Non-Medical): Not on file   Physical Activity:     Days of Exercise per Week: Not on file    Minutes of Exercise per Session: Not on file   Stress:     Feeling of Stress : Not on file   Social Connections:     Frequency of Communication with Friends and Family: Not on file    Frequency of Social Gatherings with Friends and Family: Not on file    Attends Alevism Services: Not on file    Active Member of [address] [place] [address] or Organizations: Not on file    Attends Club or Organization Meetings: Not on file    Marital Status: Not on file   Intimate Partner Violence:     Fear of Current or Ex-Partner: Not on file    Emotionally Abused: Not on file    Physically Abused: Not on file    Sexually Abused: Not on file   Housing Stability:     Unable to Pay for Housing in the Last Year: Not on file    Number of [place] in the Last Year: Not on file    Unstable Housing in the Last Year: Not on file       Vitals:    02/10/22 1322 02/10/22 1350   BP: (!) 140/82 124/84   Pulse: 100    SpO2: 98%    Weight: (!) 314 lb (142.4 kg)    Height: 6' 6.5\" (1.994 m)        Physical Exam:  Physical Exam  Vitals and nursing note reviewed. Constitutional:       General: He is not in acute distress. Appearance: Normal appearance. He is well-developed. HENT:      Head: Normocephalic and atraumatic. Right Ear: Hearing and external ear normal.      Left Ear: Hearing and external ear normal.      Nose:      Comments: Wearing mask  Eyes:      General: Lids are normal. No scleral icterus. Extraocular Movements: Extraocular movements intact. Conjunctiva/sclera: Conjunctivae normal.   Neck:      Thyroid: No thyromegaly. Cardiovascular:      Rate and Rhythm: Normal rate and regular rhythm. Heart sounds: Normal heart sounds. No murmur heard. Pulmonary:      Effort: Pulmonary effort is normal. No respiratory distress.       Breath sounds: Normal breath sounds. No wheezing. Musculoskeletal:         General: No tenderness or deformity. Normal range of motion. Cervical back: Normal range of motion and neck supple. Right lower leg: No edema. Left lower leg: No edema. Lymphadenopathy:      Cervical: No cervical adenopathy. Skin:     General: Skin is warm and dry. Findings: Erythema and rash (drying on hands and between fingers. Cracking at knuckles) present. Neurological:      General: No focal deficit present. Mental Status: He is alert and oriented to person, place, and time. Gait: Gait normal.   Psychiatric:         Mood and Affect: Affect normal. Mood is anxious. Speech: Speech is rapid and pressured. Behavior: Behavior normal.         Thought Content:  Thought content normal.         Labs:  CBC with Differential:    Lab Results   Component Value Date    WBC 7.5 09/14/2021    RBC 4.94 09/14/2021    HGB 14.3 09/14/2021    HCT 42.6 09/14/2021     09/14/2021    MCV 86.2 09/14/2021    MCH 28.9 09/14/2021    MCHC 33.6 09/14/2021    RDW 13.2 09/14/2021    LYMPHOPCT 33.5 09/14/2021    MONOPCT 8.8 09/14/2021    BASOPCT 0.5 09/14/2021    MONOSABS 0.66 09/14/2021    LYMPHSABS 2.51 09/14/2021    EOSABS 0.16 09/14/2021    BASOSABS 0.04 09/14/2021     CMP:    Lab Results   Component Value Date     09/14/2021    K 4.0 09/14/2021    CL 97 09/14/2021    CO2 21 09/14/2021    BUN 16 09/14/2021    CREATININE 0.9 09/14/2021    GFRAA >60 09/14/2021    LABGLOM >60 09/14/2021    GLUCOSE 90 09/14/2021    PROT 7.9 09/14/2021    LABALBU 4.6 09/14/2021    CALCIUM 10.1 09/14/2021    BILITOT 0.6 09/14/2021    ALKPHOS 74 09/14/2021    AST 29 09/14/2021    ALT 34 09/14/2021     U/A:    Lab Results   Component Value Date    COLORU Yellow 09/14/2021    PROTEINU Negative 09/14/2021    PHUR 5.5 09/14/2021    WBCUA 0-1 09/14/2021    RBCUA 0-1 09/14/2021    BACTERIA MODERATE 09/14/2021    CLARITYU CLOUDY 09/14/2021    SPECGRAV >=1.030 09/14/2021    LEUKOCYTESUR Negative 09/14/2021    UROBILINOGEN 0.2 09/14/2021    BILIRUBINUR Negative 09/14/2021    BLOODU Negative 09/14/2021    GLUCOSEU Negative 09/14/2021    AMORPHOUS PRESENT 09/14/2021     HgBA1c:    Lab Results   Component Value Date    LABA1C 5.3 09/14/2021     FLP:    Lab Results   Component Value Date    TRIG 63 09/14/2021    HDL 66 09/14/2021    LDLCALC 134 09/14/2021    LABVLDL 13 09/14/2021     TSH:    Lab Results   Component Value Date    TSH 4.200 09/14/2021        Assessment and Plan:  [facility] was seen today for rash and other. Diagnoses and all orders for this visit:    Intrinsic eczema  -     clobetasol (TEMOVATE) 0.05 % cream; Apply topically 2 times daily. Rash consistent with eczema due to over washing and drying of the skin. Will try topical steroids BID first    Essential hypertension  Improved on recheck. Will check labs in March to see if metabolic cause of temperature changes    Bipolar disease, chronic ([address]  Discussed medication side effects with sleepiness. He will discuss with psych    Severe obesity (BMI 35.0-39. 9) with comorbidity ([address]          Return in about 4 weeks (around 3/10/2022), or if symptoms worsen or fail to improve, for Well visit.       Seen By:  Efren New,

## 2022-02-10 NOTE — PROGRESS NOTES
2/10/22  [facility] Szymanski : 1995 Sex: male  Age: 32 y.o. Chief Complaint   Patient presents with    Rash     on hands has been there for a while     Other     temp goes up to  when he is sitting around      HPI:  32 y.o. male presents for acute visit due to ***.     ROS:  Review of Systems   Current Outpatient Medications on File Prior to Visit   Medication Sig Dispense Refill    cloNIDine (CATAPRES) 0.2 MG tablet TAKE ONE TABLET BY MOUTH AT BEDTIME      ziprasidone (GEODON) 60 MG capsule TAKE TWO CAPSULES BY MOUTH TWO TIMES A DAY      lisinopril (PRINIVIL;ZESTRIL) 10 MG tablet Take 1 tablet by mouth daily 90 tablet 1    vitamin D (ERGOCALCIFEROL) 1.25 MG (71838 UT) CAPS capsule TAKE ONE CAPSULE BY MOUTH ONCE A WEEK 12 capsule 0    triamcinolone (KENALOG) 0.1 % cream Apply topically 2 times daily Apply topically 2 times daily.  lamoTRIgine (LAMICTAL) 200 MG tablet Take 200 mg by mouth daily Take 1 and a half tablets by mouth at bedtime and one half tablet at noon.  dexmethylphenidate (FOCALIN) 10 MG tablet Take 10 mg by mouth 3 times daily. Take 2 tablets by mouth in the morning, 2 tablets at noon, and 1 tablet at 3pm      Multiple Vitamin (MULTI-VITAMIN DAILY PO) Take by mouth       No current facility-administered medications on file prior to visit. No Known Allergies    Past Medical History:   Diagnosis Date    Autism     Bipolar 1 disorder ([facility]     Essential hypertension 2021    Seizures ([facility]      History reviewed. No pertinent surgical history. History reviewed. No pertinent family history. Social History     Tobacco History     Smoking Status  Never Smoker    Smokeless Tobacco Use  Never Used                 Vitals:    02/10/22 1322   BP: (!) 140/82   Pulse: 100   SpO2: 98%   Weight: (!) 314 lb (142.4 kg)   Height: 6' 6.5\" (1.994 m)       Physical Exam:  Physical Exam    No results found for this visit on 02/10/22.     Assessment and Plan:  There are no diagnoses linked to this encounter. No follow-ups on file.       Seen By:  Adair Thakur DO

## 2022-02-10 NOTE — PROGRESS NOTES
[address] [facility] Surgery Clinic Note    Assessment/Plan:      Diagnosis Orders   1. Rectal bleeding  phenylephrine-cocoa butter (PREPARATION H) 0.25-88.44 %    Likely benign. Given longstanding we will plan for colonoscopy. Return for Colonoscopy. Chief Complaint   Patient presents with    New Patient     positive FIT        PCP: Marjorie Betancur DO    HPI: Kendrick Johnson is a 32 y.o. male who presents in consultation for positive fit test.  He complains of hemorrhoids. He has had blood intermittently for the last 3 or 4 months at least if not longer. Is not related to have hard stool or constipation. He  has never had colonoscopy previously. Happen several times a week currently. It is on the stool not mixed within it. Is also on the toilet tissue      Past Medical History:   Diagnosis Date    Autism     Bipolar 1 disorder ([facility]     Essential hypertension 5/4/2021    [facility]        History reviewed. No pertinent surgical history. Prior to Admission medications    Medication Sig Start Date End Date Taking? Authorizing Provider   clobetasol (TEMOVATE) 0.05 % cream Apply topically 2 times daily. 2/10/22  Yes Marjorie Betancur DO   phenylephrine-cocoa butter (PREPARATION H) 0.25-88.44 % Place 1 suppository rectally as needed for Hemorrhoids 2/10/22  Yes Katarzyna Altamirano MD   cloNIDine (CATAPRES) 0.2 MG tablet TAKE ONE TABLET BY MOUTH AT BEDTIME 11/16/21  Yes Historical Provider, MD   ziprasidone (GEODON) 60 MG capsule TAKE TWO CAPSULES BY MOUTH TWO TIMES A DAY 10/7/21  Yes Historical Provider, MD   lisinopril (PRINIVIL;ZESTRIL) 10 MG tablet Take 1 tablet by mouth daily 12/3/21  Yes Marjorie Betancur DO   vitamin D (ERGOCALCIFEROL) 1.25 MG (95799 UT) CAPS capsule TAKE ONE CAPSULE BY MOUTH ONCE A WEEK 11/29/21  Yes Rhonda Vargas DO   triamcinolone (KENALOG) 0.1 % cream Apply topically 2 times daily Apply topically 2 times daily.    Yes Historical Provider, MD   lamoTRIgine (LAMICTAL) 200 MG tablet Take 200 mg by mouth daily Take 1 and a half tablets by mouth at bedtime and one half tablet at noon. Yes Historical Provider, MD   dexmethylphenidate (FOCALIN) 10 MG tablet Take 10 mg by mouth 3 times daily. Take 2 tablets by mouth in the morning, 2 tablets at noon, and 1 tablet at 3pm   Yes Historical Provider, MD   Multiple Vitamin (MULTI-VITAMIN DAILY PO) Take by mouth   Yes Historical Provider, MD       No Known Allergies    Social History     Socioeconomic History    Marital status: Single     Spouse name: None    Number of children: None    Years of education: None    Highest education level: None   Occupational History    None   Tobacco Use    Smoking status: Never Smoker    Smokeless tobacco: Never Used   Substance and Sexual Activity    Alcohol use: Not Currently    Drug use: Not Currently    Sexual activity: Not Currently   Other Topics Concern    None   Social History Narrative    None     Social Determinants of Health     Financial Resource Strain:     Difficulty of Paying Living Expenses: Not on file   Food Insecurity:     Worried About Running Out of Food in the Last Year: Not on file    Chao of Food in the Last Year: Not on file   Transportation Needs:     Lack of Transportation (Medical): Not on file    Lack of Transportation (Non-Medical):  Not on file   Physical Activity:     Days of Exercise per Week: Not on file    Minutes of Exercise per Session: Not on file   Stress:     Feeling of Stress : Not on file   Social Connections:     Frequency of Communication with Friends and Family: Not on file    Frequency of Social Gatherings with Friends and Family: Not on file    Attends Alevism Services: Not on file    Active Member of Clubs or Organizations: Not on file    Attends Club or Organization Meetings: Not on file    Marital Status: Not on file   Intimate Partner Violence:     Fear of Current or Ex-Partner: Not on file    Emotionally Abused: Not on file   Milena Olivo Physically Abused: Not on file    Sexually Abused: Not on file   Housing Stability:     Unable to Pay for Housing in the Last Year: Not on file    Number of Places Lived in the Last Year: Not on file    Unstable Housing in the Last Year: Not on file       No family history on file. Review of Systems   All other systems reviewed and are negative. Objective:  Vitals:    02/10/22 1445   BP: 138/76   Pulse: 88   Resp: 18   Temp: 97 °F (36.1 °C)   TempSrc: Temporal   SpO2: 98%   Weight: (!) 316 lb (143.3 kg)   Height: 6' 6\" (1.981 m)          Physical Exam  Constitutional:       General: He is not in acute distress. Appearance: He is not diaphoretic. HENT:      Head: Normocephalic and atraumatic. Eyes:      General:         Right eye: No discharge. Left eye: No discharge. Neck:      Trachea: No tracheal deviation. Cardiovascular:      Rate and Rhythm: Normal rate. Pulmonary:      Effort: Pulmonary effort is normal. No respiratory distress. Abdominal:      General: There is no distension. Palpations: Abdomen is soft. Tenderness: There is no abdominal tenderness. There is no guarding or rebound. Genitourinary:     Rectum: Normal.   Skin:     General: Skin is warm and dry. Neurological:      Mental Status: He is alert and oriented to person, place, and time. Miguel Christianson MD      NOTE: This report, in part or full,may have been transcribed using voice recognition software. Every effort was made to ensure accuracy; however, inadvertent computerized transcription errors may be present. Please excuse any transcriptional grammatical or spelling errors that may have escaped my editorial review.     CC: Roseann Nj DO

## 2022-02-15 ENCOUNTER — TELEPHONE (OUTPATIENT)
Dept: SURGERY | Age: 27
End: 2022-02-15

## 2022-02-15 NOTE — TELEPHONE ENCOUNTER
Kailey Snider is scheduled for colonoscopy with Dr Bladimir Bueno on 3/31/22 at 11:00am at [facility]. Patient was told to arrive at 10:00am. Patient needs to be NPO after midnight the night before procedure. All surgery instructions were explained to the patient and a surgery letter was also mailed out. MA informed patient that PAT will also be calling to review pre-op instructions and medications. Patient verbalized understanding.     Electronically signed by Yanira Crowell on 2/15/2022 at 1:40 PM

## 2022-02-15 NOTE — TELEPHONE ENCOUNTER
Prior Authorization Form:      DEMOGRAPHICS:                     Patient Name:  Jorge Adler  Patient :  1995            Insurance:  Payor: MEDICARE / Plan: MEDICARE PART A AND B / Product Type: *No Product type* /   Insurance ID Number:    Payor/Plan Subscr  Sex Relation Sub. Ins. ID Effective Group Num   1. MEDICARE - [place] Jason 1995 Male Self [id number]                                    [address] [address]   2.  [place] Patrick Gomez 1995 Male Self [id number] 1/1/15                                    [address]         DIAGNOSIS & PROCEDURE:                       Procedure/Operation: colonoscopy           CPT Code: 14427    Diagnosis:  Positive fit    ICD10 Code: R19.5    Location:  [facility]    Surgeon:  Dr. Ugo Garcia INFORMATION:                          Date: 22    Time: 11:00am              Anesthesia: [facility]                                                       Status:  Outpatient        Special Comments:         Electronically signed by Charisse To on 2/15/2022 at 1:41 PM

## 2022-02-15 NOTE — TELEPHONE ENCOUNTER
Per patient please send surgery paperwork to patients parents house. [id number] [facility] [id number]    Sending paperwork there as of 2/15/22.   Electronically signed by Louann Celeste on 2/15/2022 at 3:42 PM

## 2022-02-15 NOTE — TELEPHONE ENCOUNTER
Patient called MA concerned about the color of his stool. Advised patient that abnormal color of stool would be a tar black or red. Light brown is normal. Patient acknowledged.   Electronically signed by Hong Case on 2/15/2022 at 3:53 PM

## 2022-03-15 ENCOUNTER — OFFICE VISIT (OUTPATIENT)
Dept: PRIMARY CARE CLINIC | Age: 27
End: 2022-03-15
Payer: MEDICARE

## 2022-03-15 VITALS
HEIGHT: 78 IN | BODY MASS INDEX: 36.1 KG/M2 | SYSTOLIC BLOOD PRESSURE: 136 MMHG | WEIGHT: 312 LBS | TEMPERATURE: 97.2 F | DIASTOLIC BLOOD PRESSURE: 84 MMHG | HEART RATE: 82 BPM | OXYGEN SATURATION: 98 %

## 2022-03-15 DIAGNOSIS — I83.91 ASYMPTOMATIC VARICOSE VEINS OF RIGHT LOWER EXTREMITY: ICD-10-CM

## 2022-03-15 DIAGNOSIS — Z00.01 ENCOUNTER FOR WELL ADULT EXAM WITH ABNORMAL FINDINGS: Primary | ICD-10-CM

## 2022-03-15 DIAGNOSIS — L20.84 INTRINSIC ECZEMA: ICD-10-CM

## 2022-03-15 DIAGNOSIS — J30.89 SEASONAL ALLERGIC RHINITIS DUE TO OTHER ALLERGIC TRIGGER: ICD-10-CM

## 2022-03-15 DIAGNOSIS — I10 ESSENTIAL HYPERTENSION: ICD-10-CM

## 2022-03-15 PROCEDURE — G8484 FLU IMMUNIZE NO ADMIN: HCPCS | Performed by: FAMILY MEDICINE

## 2022-03-15 PROCEDURE — 99395 PREV VISIT EST AGE 18-39: CPT | Performed by: FAMILY MEDICINE

## 2022-03-15 RX ORDER — FLUTICASONE PROPIONATE 50 MCG
1 SPRAY, SUSPENSION (ML) NASAL DAILY
Qty: 16 G | Refills: 5 | Status: SHIPPED
Start: 2022-03-15 | End: 2022-08-16

## 2022-03-15 RX ORDER — CLONIDINE HYDROCHLORIDE 0.3 MG/1
TABLET ORAL
COMMUNITY
Start: 2022-02-25

## 2022-03-15 ASSESSMENT — ENCOUNTER SYMPTOMS
NAUSEA: 0
DIARRHEA: 0
ABDOMINAL PAIN: 0
COUGH: 0
VOMITING: 0
WHEEZING: 0
CONSTIPATION: 0
SHORTNESS OF BREATH: 0
BACK PAIN: 0

## 2022-03-15 NOTE — PROGRESS NOTES
3/15/22  Juan Plaster : 1995 Sex: male  Age: 32 y.o. Chief Complaint   Patient presents with    Annual Exam    Mass     right leg    Eczema     pt states he is only using the cream at night because he doesn't want his cat to lick it off during the day     HPI:  32 y.o. male presents today for yearly physical.  Patient's chart, medical, surgical and medication history all reviewed. Well Adult Physical  Patient here for a physical exam.  The patient reports problems - rash on hands improving, but not using steroid cream BID as prescribed. Lump on R medial thigh. Do you take any herbs or supplements that were not prescribed by a doctor? no   Are you taking calcium supplements? no   Are you taking aspirin daily? no    Colonoscopy: No prior colonoscopy  Dental visit: Within last 6 mos  Vision check:  Glasses    Last time routine bloodwork was done: 2021    Immunization status: missing doses of HPV, COVID. Smoking status: never    Physical activity:  never    ROS:  Review of Systems   Constitutional: Negative for chills, fatigue and fever. Respiratory: Negative for cough, shortness of breath and wheezing. Cardiovascular: Negative for chest pain and palpitations. Gastrointestinal: Negative for abdominal pain, constipation, diarrhea, nausea and vomiting. Musculoskeletal: Negative for arthralgias and back pain. Skin: Positive for rash. Neurological: Negative for dizziness and headaches. Psychiatric/Behavioral: Positive for behavioral problems and sleep disturbance. Negative for dysphoric mood. The patient is nervous/anxious. All other systems reviewed and are negative. Current Outpatient Medications on File Prior to Visit   Medication Sig Dispense Refill    clobetasol (TEMOVATE) 0.05 % cream Apply topically 2 times daily.  60 g 1    phenylephrine-cocoa butter (PREPARATION H) 0.25-88.44 % Place 1 suppository rectally as needed for Hemorrhoids 12 suppository 2    ziprasidone (GEODON) 60 MG capsule TAKE TWO CAPSULES BY MOUTH TWO TIMES A DAY      lisinopril (PRINIVIL;ZESTRIL) 10 MG tablet Take 1 tablet by mouth daily 90 tablet 1    vitamin D (ERGOCALCIFEROL) 1.25 MG (83271 UT) CAPS capsule TAKE ONE CAPSULE BY MOUTH ONCE A WEEK 12 capsule 0    triamcinolone (KENALOG) 0.1 % cream Apply topically 2 times daily Apply topically 2 times daily.  lamoTRIgine (LAMICTAL) 200 MG tablet Take 200 mg by mouth daily Take 1 and a half tablets by mouth at bedtime and one half tablet at noon.  dexmethylphenidate (FOCALIN) 10 MG tablet Take 10 mg by mouth 3 times daily. Take 2 tablets by mouth in the morning, 2 tablets at noon, and 1 tablet at 3pm      Multiple Vitamin (MULTI-VITAMIN DAILY PO) Take by mouth      cloNIDine (CATAPRES) 0.3 MG tablet TAKE ONE TABLET BY MOUTH AT BEDTIME       No current facility-administered medications on file prior to visit. No Known Allergies    Past Medical History:   Diagnosis Date    Autism     [facility]     Essential hypertension 5/4/2021    Seizures ([facility]      History reviewed. No pertinent surgical history. History reviewed. No pertinent family history.   Social History     Socioeconomic History    Marital status: Single     Spouse name: Not on file    Number of children: Not on file    Years of education: Not on file    Highest education level: Not on file   Occupational History    Not on file   Tobacco Use    Smoking status: Never Smoker    Smokeless tobacco: Never Used   Substance and Sexual Activity    Alcohol use: Not Currently    Drug use: Not Currently    Sexual activity: Not Currently   Other Topics Concern    Not on file   Social History Narrative    Not on file     Social Determinants of Health     Financial Resource Strain:     Difficulty of Paying Living Expenses: Not on file   Food Insecurity:     Worried About Running Out of Food in the Last Year: Not on file    [address] in the Last Year: Not on file   Transportation Needs:     Lack of Transportation (Medical): Not on file    Lack of Transportation (Non-Medical): Not on file   Physical Activity:     Days of Exercise per Week: Not on file    Minutes of Exercise per Session: Not on file   Stress:     Feeling of Stress : Not on file   Social Connections:     Frequency of Communication with Friends and Family: Not on file    Frequency of Social Gatherings with Friends and Family: Not on file    Attends Orthodoxy Services: Not on file    Active Member of [address] [place] [address] [employer] or Organizations: Not on file    Attends Club or Organization Meetings: Not on file    Marital Status: Not on file   Intimate Partner Violence:     Fear of Current or Ex-Partner: Not on file    Emotionally Abused: Not on file    Physically Abused: Not on file    Sexually Abused: Not on file   Housing Stability:     Unable to Pay for Housing in the Last Year: Not on file    Number of [place] in the Last Year: Not on file    Unstable Housing in the Last Year: Not on file       Vitals:    03/15/22 1125 03/15/22 1145   BP: (!) 140/90 136/84   Pulse: 82    Temp: 97.2 °F (36.2 °C)    SpO2: 98%    Weight: (!) 312 lb (141.5 kg)    Height: 6' 6\" (1.981 m)        Physical Exam:  Physical Exam  Vitals and nursing note reviewed. Constitutional:       General: He is not in acute distress. Appearance: Normal appearance. He is well-developed. HENT:      Head: Normocephalic and atraumatic. Right Ear: Hearing and external ear normal.      Left Ear: Hearing and external ear normal.      Nose:      Comments: Wearing mask  Eyes:      General: Lids are normal. No scleral icterus. Extraocular Movements: Extraocular movements intact. Conjunctiva/sclera: Conjunctivae normal.   Neck:      Thyroid: No thyromegaly. Cardiovascular:      Rate and Rhythm: Normal rate and regular rhythm. Heart sounds: Normal heart sounds. No murmur heard.       Pulmonary:      Effort: Pulmonary effort is normal. No respiratory distress. Breath sounds: Normal breath sounds. No wheezing. Musculoskeletal:         General: No tenderness or deformity. Normal range of motion. Cervical back: Normal range of motion and neck supple. Right lower leg: No edema. Left lower leg: No edema. Lymphadenopathy:      Cervical: No cervical adenopathy. Skin:     General: Skin is warm and dry. Findings: Erythema and rash (drying on hands and between fingers. Cracking at knuckles) present. Comments: Varicose vein noted R medial thigh   Neurological:      General: No focal deficit present. Mental Status: He is alert and oriented to person, place, and time. Gait: Gait normal.   Psychiatric:         Mood and Affect: Affect normal. Mood is anxious. Speech: Speech is rapid and pressured. Behavior: Behavior normal.         Thought Content:  Thought content normal.         Labs:  CBC with Differential:    Lab Results   Component Value Date    WBC 7.5 09/14/2021    RBC 4.94 09/14/2021    HGB 14.3 09/14/2021    HCT 42.6 09/14/2021     09/14/2021    MCV 86.2 09/14/2021    MCH 28.9 09/14/2021    MCHC 33.6 09/14/2021    RDW 13.2 09/14/2021    LYMPHOPCT 33.5 09/14/2021    MONOPCT 8.8 09/14/2021    BASOPCT 0.5 09/14/2021    MONOSABS 0.66 09/14/2021    LYMPHSABS 2.51 09/14/2021    EOSABS 0.16 09/14/2021    BASOSABS 0.04 09/14/2021     CMP:    Lab Results   Component Value Date     09/14/2021    K 4.0 09/14/2021    CL 97 09/14/2021    CO2 21 09/14/2021    BUN 16 09/14/2021    CREATININE 0.9 09/14/2021    GFRAA >60 09/14/2021    LABGLOM >60 09/14/2021    GLUCOSE 90 09/14/2021    PROT 7.9 09/14/2021    LABALBU 4.6 09/14/2021    CALCIUM 10.1 09/14/2021    BILITOT 0.6 09/14/2021    ALKPHOS 74 09/14/2021    AST 29 09/14/2021    ALT 34 09/14/2021     U/A:    Lab Results   Component Value Date    COLORU Yellow 09/14/2021    PROTEINU Negative 09/14/2021    PHUR 5.5 09/14/2021    WBCUA 0-1 09/14/2021    RBCUA 0-1 09/14/2021    BACTERIA MODERATE 09/14/2021    CLARITYU CLOUDY 09/14/2021    SPECGRAV >=1.030 09/14/2021    LEUKOCYTESUR Negative 09/14/2021    UROBILINOGEN 0.2 09/14/2021    BILIRUBINUR Negative 09/14/2021    BLOODU Negative 09/14/2021    GLUCOSEU Negative 09/14/2021    AMORPHOUS PRESENT 09/14/2021     HgBA1c:    Lab Results   Component Value Date    LABA1C 5.3 09/14/2021     FLP:    Lab Results   Component Value Date    TRIG 63 09/14/2021    HDL 66 09/14/2021    LDLCALC 134 09/14/2021    LABVLDL 13 09/14/2021     TSH:    Lab Results   Component Value Date    TSH 4.200 09/14/2021        Assessment and Plan:  Cele Tracy was seen today for annual exam, mass and eczema. Diagnoses and all orders for this visit:    Encounter for well adult exam with abnormal findings  Patient doing well overall. Labs well controlled in September 2021. UTD on HM. Essential hypertension  Improved on recheck    Intrinsic eczema  Encouraged patient to use steroid cream BID    Asymptomatic varicose veins of right lower extremity    Seasonal allergic rhinitis due to other allergic trigger  -     fluticasone (FLONASE) 50 MCG/ACT nasal spray; 1 spray by Each Nostril route daily        Return in about 6 months (around 9/15/2022), or if symptoms worsen or fail to improve, for Chronic medical conditions.       Seen By:  Pema Madsen DO

## 2022-03-28 ENCOUNTER — TELEPHONE (OUTPATIENT)
Dept: SURGERY | Age: 27
End: 2022-03-28

## 2022-03-28 NOTE — TELEPHONE ENCOUNTER
Patient called and needed to reschedule his colonoscopy due to personal reasons. MA rescheduled colonoscopy to 05-13-22 at [facility] at 1:00 pm, arrive at 12:00 pm.  MA mailed out new surgery and follow up letters.   Electronically signed by Ap Zavala MA on 3/28/2022 at 3:12 PM

## 2022-03-28 NOTE — TELEPHONE ENCOUNTER
Prior Authorization Form:      DEMOGRAPHICS:                     Patient Name:  Mary Sprain  Patient :  1995            Insurance:  Payor: MEDICARE / Plan: MEDICARE PART A AND B / Product Type: *No Product type* /   Insurance ID Number:    Payor/Plan Subscr  Sex Relation Sub. Ins. ID Effective Group Num   1. MEDICARE - Faustino Aburtoory 1995 Male Self [id number]                                    [address] [address]   2.  [place] Rudolph Mallory 1995 Male Self [id number] 1/1/15                                    [address]         DIAGNOSIS & PROCEDURE:                       Procedure/Operation: Colonoscopy           CPT Code: 51781    Diagnosis:  Positive fit     ICD10 Code: R19.5    [facility]  [facility]    Surgeon:  Dr Livia Zamarripa INFORMATION:                          Date: 22    Time: 1:00 pm              Anesthesia:  [facility]                                                       Status:  Outpatient        Special Comments:         Electronically signed by Buck Frye MA on 3/28/2022 at 3:13 PM

## 2022-05-03 ENCOUNTER — TELEPHONE (OUTPATIENT)
Dept: SURGERY | Age: 27
End: 2022-05-03

## 2022-05-03 NOTE — TELEPHONE ENCOUNTER
Received a call from the patient who stated that he misplaced the colon prep and instructions. The patient wants to go over the colon prep instructions. MA told him that Dr. Sunday Landau staff will call him tomorrow and go over the instruction with him. The patient verbalized understanding. The patient is requesting to mail the instruction to his [facility]  [address] [facility], [address] [place] [address]  Forwarded message to [employer].   Electronically signed by Nena Hammans on 5/3/2022 at 4:25 PM

## 2022-05-04 RX ORDER — BACLOFEN 10 MG/1
10 TABLET ORAL 3 TIMES DAILY PRN
COMMUNITY

## 2022-05-04 NOTE — TELEPHONE ENCOUNTER
MA called patient and emailed prep to him. MA went over the instructions as well as giving him PAT number regarding medications.   Electronically signed by Dorian Romero on 5/4/2022 at 1:48 PM

## 2022-05-10 NOTE — PROGRESS NOTES
Have you been tested for COVID  No           Have you been told you were positive for COVID Yes  Have you had any known exposure to someone that is positive for COVID No  Do you have a cough                   No              Do you have shortness of breath No                 Do you have a sore throat            No                Are you having chills                    No                Are you having muscle aches. No                    Please come to the hospital wearing a mask and have your significant other wear a mask as well. Both of you should check your temperature before leaving to come here,  if it is 100 or higher please call [phone] for instruction. Fanny PRE-ADMISSION TESTING INSTRUCTIONS    The Preadmission Testing patient is instructed accordingly using the following criteria (check applicable):    ARRIVAL INSTRUCTIONS:  [x] Parking the day of Surgery is located in the Main Entrance lot. Upon entering the door, make an immediate right to the surgery reception desk    [x] Bring photo ID and insurance card    [x] Bring in a copy of Living will or Durable Power of  papers.     [x] Please be sure to arrange for responsible adult to provide transportation to and from the hospital    [x] Please arrange for responsible adult to be with you for the 24 hour period post procedure due to having anesthesia      GENERAL INSTRUCTIONS:    [x] Nothing by mouth after midnight, including gum, candy, mints or water    [x] You may brush your teeth, but do not swallow any water    [x] Take medications as instructed with 1-2 oz of water    [x] Stop herbal supplements and vitamins 5 days prior to procedure    [] Follow preop dosing of blood thinners per physician instructions    [] Take 1/2 dose of evening insulin, but no insulin after midnight    [] No oral diabetic medications after midnight    [] If diabetic and have low blood sugar or feel symptomatic, take 1-2oz apple juice only    [] Bring inhalers day of surgery    [] Bring C-PAP/ Bi-Pap day of surgery    [] Bring urine specimen day of surgery    [x] Shower or bath with soap, lather and rinse well, AM of Surgery, no lotion, powders or creams to surgical site    [x] Follow bowel prep as instructed per surgeon    [] No tobacco products within 24 hours of surgery     [x] No alcohol or illegal drug use within 24 hours of surgery.     [x] Jewelry, body piercing's, eyeglasses, contact lenses and dentures are not permitted into surgery (bring cases)      [x] Please do not wear any nail polish, make up or hair products on the day of surgery    [x] You can expect a call the business day prior to procedure to notify you if your arrival time changes    [x] If you receive a survey after surgery we would greatly appreciate your comments    [] Parent/guardian of a minor must accompany their child and remain on the premises  the entire time they are under our care     [] Pediatric patients may bring favorite toy, blanket or comfort item with them    [] A caregiver or family member must remain with the patient during their stay if they are mentally handicapped, have dementia, disoriented or unable to use a call light or would be a safety concern if left unattended    [x] Please notify surgeon if you develop any illness between now and time of surgery (cold, cough, sore throat, fever, nausea, vomiting) or any signs of infections  including skin, wounds, and dental.    [x]  The Outpatient Pharmacy is available to fill your prescription here on your day of surgery, ask your preop nurse for details    [] Other instructions    EDUCATIONAL MATERIALS PROVIDED:    [] PAT Preoperative Education Packet/Booklet     [] Medication List    [] Transfusion bracelet applied with instructions    [] Shower with soap, lather and rinse well, and use CHG wipes provided the evening before surgery as instructed    [] Incentive spirometer with instructions

## 2022-05-13 ENCOUNTER — ANESTHESIA (OUTPATIENT)
Dept: ENDOSCOPY | Age: 27
End: 2022-05-13
Payer: MEDICARE

## 2022-05-13 ENCOUNTER — HOSPITAL ENCOUNTER (OUTPATIENT)
Age: 27
Setting detail: OUTPATIENT SURGERY
Discharge: HOME | End: 2022-05-13
Attending: SURGERY | Admitting: SURGERY
Payer: MEDICARE

## 2022-05-13 ENCOUNTER — ANESTHESIA EVENT (OUTPATIENT)
Dept: ENDOSCOPY | Age: 27
End: 2022-05-13
Payer: MEDICARE

## 2022-05-13 VITALS
HEART RATE: 82 BPM | DIASTOLIC BLOOD PRESSURE: 86 MMHG | TEMPERATURE: 97.9 F | OXYGEN SATURATION: 98 % | BODY MASS INDEX: 35.4 KG/M2 | HEIGHT: 78 IN | RESPIRATION RATE: 16 BRPM | WEIGHT: 306 LBS | SYSTOLIC BLOOD PRESSURE: 150 MMHG

## 2022-05-13 VITALS — SYSTOLIC BLOOD PRESSURE: 139 MMHG | DIASTOLIC BLOOD PRESSURE: 70 MMHG | OXYGEN SATURATION: 99 %

## 2022-05-13 PROCEDURE — 2580000003 HC RX 258: Performed by: NURSE ANESTHETIST, CERTIFIED REGISTERED

## 2022-05-13 PROCEDURE — 3609027000 HC COLONOSCOPY: Performed by: SURGERY

## 2022-05-13 PROCEDURE — 7100000010 HC PHASE II RECOVERY - FIRST 15 MIN: Performed by: SURGERY

## 2022-05-13 PROCEDURE — 2709999900 HC NON-CHARGEABLE SUPPLY: Performed by: SURGERY

## 2022-05-13 PROCEDURE — 6360000002 HC RX W HCPCS: Performed by: NURSE ANESTHETIST, CERTIFIED REGISTERED

## 2022-05-13 PROCEDURE — 7100000011 HC PHASE II RECOVERY - ADDTL 15 MIN: Performed by: SURGERY

## 2022-05-13 PROCEDURE — 3700000001 HC ADD 15 MINUTES (ANESTHESIA): Performed by: SURGERY

## 2022-05-13 PROCEDURE — 3700000000 HC ANESTHESIA ATTENDED CARE: Performed by: SURGERY

## 2022-05-13 PROCEDURE — 45378 DIAGNOSTIC COLONOSCOPY: CPT | Performed by: SURGERY

## 2022-05-13 RX ORDER — MIDAZOLAM HYDROCHLORIDE 1 MG/ML
INJECTION INTRAMUSCULAR; INTRAVENOUS PRN
Status: DISCONTINUED | OUTPATIENT
Start: 2022-05-13 | End: 2022-05-13 | Stop reason: SDUPTHER

## 2022-05-13 RX ORDER — SODIUM CHLORIDE 9 MG/ML
INJECTION, SOLUTION INTRAVENOUS CONTINUOUS PRN
Status: DISCONTINUED | OUTPATIENT
Start: 2022-05-13 | End: 2022-05-13 | Stop reason: SDUPTHER

## 2022-05-13 RX ORDER — PROPOFOL 10 MG/ML
INJECTION, EMULSION INTRAVENOUS PRN
Status: DISCONTINUED | OUTPATIENT
Start: 2022-05-13 | End: 2022-05-13 | Stop reason: SDUPTHER

## 2022-05-13 RX ADMIN — MIDAZOLAM 2 MG: 1 INJECTION INTRAMUSCULAR; INTRAVENOUS at 12:40

## 2022-05-13 RX ADMIN — PROPOFOL 260 MG: 10 INJECTION, EMULSION INTRAVENOUS at 12:42

## 2022-05-13 RX ADMIN — SODIUM CHLORIDE: 9 INJECTION, SOLUTION INTRAVENOUS at 12:43

## 2022-05-13 ASSESSMENT — LIFESTYLE VARIABLES: SMOKING_STATUS: 0

## 2022-05-13 ASSESSMENT — PAIN - FUNCTIONAL ASSESSMENT
PAIN_FUNCTIONAL_ASSESSMENT: NONE - DENIES PAIN
PAIN_FUNCTIONAL_ASSESSMENT: ACTIVITIES ARE NOT PREVENTED

## 2022-05-13 ASSESSMENT — PAIN SCALES - GENERAL: PAINLEVEL_OUTOF10: 0

## 2022-05-13 NOTE — ANESTHESIA POSTPROCEDURE EVALUATION
Department of Anesthesiology  Postprocedure Note    Patient: Haylie Ott  MRN: [id number]  YOB: 1995  Date of evaluation: 5/13/2022  Time:  1:01 PM     Procedure Summary     Date: 05/13/22 Room / Location: [address]    Anesthesia Start: 1237 Anesthesia Stop:     Procedure: COLONOSCOPY DIAGNOSTIC (N/A ) Diagnosis: (POSITIVE FIT TEST)    Surgeons: Prasanna Samson MD Responsible Provider: Vipin Gilmore MD    Anesthesia Type: MAC ASA Status: 3          Anesthesia Type: No value filed. Janeen Phase I: Janeen Score: 10    Janeen Phase II:      Last vitals: Reviewed and per EMR flowsheets.        Anesthesia Post Evaluation    Patient location during evaluation: PACU  Patient participation: complete - patient participated  Level of consciousness: awake and alert  Pain score: 0  Airway patency: patent  Nausea & Vomiting: no nausea and no vomiting  Complications: no  Cardiovascular status: blood pressure returned to baseline  Respiratory status: acceptable  Hydration status: euvolemic

## 2022-05-13 NOTE — H&P
[address] [facility] Clinic Note     Assessment/Plan:        Diagnosis Orders   1. Rectal bleeding  phenylephrine-cocoa butter (PREPARATION H) 0.25-88.44 %     Likely benign. Given longstanding we will plan for colonoscopy.            Return for Colonoscopy.             Chief Complaint   Patient presents with    New Patient       positive FIT          PCP: Morelia Bejarano DO     HPI: Jayesh Ureña is a 32 y.o. male who presents in consultation for positive fit test.  He complains of hemorrhoids. He has had blood intermittently for the last 3 or 4 months at least if not longer. Is not related to have hard stool or constipation. He  has never had colonoscopy previously. Happen several times a week currently. It is on the stool not mixed within it. Is also on the toilet tissue        Past Medical History        Past Medical History:   Diagnosis Date    Autism      Bipolar 1 disorder ([facility] [address]      Essential hypertension 5/4/2021    Seizures ([facility] [address]              Past Surgical History   History reviewed. No pertinent surgical history.        Home Medications           Prior to Admission medications    Medication Sig Start Date End Date Taking? Authorizing Provider   clobetasol (TEMOVATE) 0.05 % cream Apply topically 2 times daily.  2/10/22   Yes Morelia Bejarano DO   phenylephrine-cocoa butter (PREPARATION H) 0.25-88.44 % Place 1 suppository rectally as needed for Hemorrhoids 2/10/22   Yes Mir Zepeda MD   cloNIDine (CATAPRES) 0.2 MG tablet TAKE ONE TABLET BY MOUTH AT BEDTIME 11/16/21   Yes Historical Provider, MD   ziprasidone (GEODON) 60 MG capsule TAKE TWO CAPSULES BY MOUTH TWO TIMES A DAY 10/7/21   Yes Historical Provider, MD   lisinopril (PRINIVIL;ZESTRIL) 10 MG tablet Take 1 tablet by mouth daily 12/3/21   Yes Morelia Bejarano DO   vitamin D (ERGOCALCIFEROL) 1.25 MG (71790 UT) CAPS capsule TAKE ONE CAPSULE BY MOUTH ONCE A WEEK 11/29/21   Yes Rhonda Vargas DO   triamcinolone (KENALOG) 0.1 % cream Apply topically 2 times daily Apply topically 2 times daily.     Yes Historical Provider, MD   lamoTRIgine (LAMICTAL) 200 MG tablet Take 200 mg by mouth daily Take 1 and a half tablets by mouth at bedtime and one half tablet at noon.     Yes Historical Provider, MD   dexmethylphenidate (FOCALIN) 10 MG tablet Take 10 mg by mouth 3 times daily. Take 2 tablets by mouth in the morning, 2 tablets at noon, and 1 tablet at 3pm     Yes Historical Provider, MD   Multiple Vitamin (MULTI-VITAMIN DAILY PO) Take by mouth     Yes Historical Provider, MD            No Known Allergies     Social History   Social History            Socioeconomic History    Marital status: Single       Spouse name: None    Number of children: None    Years of education: None    Highest education level: None   Occupational History    None   Tobacco Use    Smoking status: Never Smoker    Smokeless tobacco: Never Used   Substance and Sexual Activity    Alcohol use: Not Currently    Drug use: Not Currently    Sexual activity: Not Currently   Other Topics Concern    None   Social History Narrative    None      Social Determinants of Health          Financial Resource Strain:     Difficulty of Paying Living Expenses: Not on file   Food Insecurity:     Worried About Running Out of Food in the Last Year: Not on file    Chao of Food in the Last Year: Not on file   Transportation Needs:     Lack of Transportation (Medical): Not on file    Lack of Transportation (Non-Medical):  Not on file   Physical Activity:     Days of Exercise per Week: Not on file    Minutes of Exercise per Session: Not on file   Stress:     Feeling of Stress : Not on file   Social Connections:     Frequency of Communication with Friends and Family: Not on file    Frequency of Social Gatherings with Friends and Family: Not on file    Attends Jain Services: Not on file    Active Member of Clubs or Organizations: Not on file    Attends Club or Organization Meetings: Not on file    Marital Status: Not on file   Intimate Partner Violence:     Fear of Current or Ex-Partner: Not on file    Emotionally Abused: Not on file    Physically Abused: Not on file    Sexually Abused: Not on file   Housing Stability:     Unable to Pay for Housing in the Last Year: Not on file    Number of [place] in the Last Year: Not on file    Unstable Housing in the Last Year: Not on file            Family History   No family history on file.        Review of Systems   All other systems reviewed and are negative.                  Objective:  Vitals       Vitals:     02/10/22 1445   BP: 138/76   Pulse: 88   Resp: 18   Temp: 97 °F (36.1 °C)   TempSrc: Temporal   SpO2: 98%   Weight: (!) 316 lb (143.3 kg)   Height: 6' 6\" (1.981 m)            Physical Exam  Constitutional:       General: He is not in acute distress. Appearance: He is not diaphoretic. HENT:      Head: Normocephalic and atraumatic. Eyes:      General:         Right eye: No discharge. Left eye: No discharge. Neck:      Trachea: No tracheal deviation. Cardiovascular:      Rate and Rhythm: Normal rate. Pulmonary:      Effort: Pulmonary effort is normal. No respiratory distress. Abdominal:      General: There is no distension. Palpations: Abdomen is soft. Tenderness: There is no abdominal tenderness. There is no guarding or rebound. Genitourinary:     Rectum: Normal.   Skin:     General: Skin is warm and dry. Neurological:      Mental Status: He is alert and oriented to person, place, and time.                      Jenn Savage MD        NOTE: This report, in part or full,may have been transcribed using voice recognition software. Every effort was made to ensure accuracy; however, inadvertent computerized transcription errors may be present. Please excuse any transcriptional grammatical or spelling errors that may have escaped my editorial review.     CC:  Darren Daley,

## 2022-05-13 NOTE — ANESTHESIA PRE PROCEDURE
Department of Anesthesiology  Preprocedure Note       Name:  Nandini Murguia   Age:  32 y.o.  :  1995                                          MRN:  [id number]         Date:  2022      Surgeon: Louann Garcia):  Pal Orozco MD    Procedure: Procedure(s):  COLONOSCOPY DIAGNOSTIC    Medications prior to admission:   Prior to Admission medications    Medication Sig Start Date End Date Taking? Authorizing Provider   baclofen (LIORESAL) 10 MG tablet Take 10 mg by mouth 3 times daily as needed   Yes Historical Provider, MD   cloNIDine (CATAPRES) 0.3 MG tablet TAKE ONE TABLET BY MOUTH AT BEDTIME 22   Historical Provider, MD   fluticasone (FLONASE) 50 MCG/ACT nasal spray 1 spray by Each Nostril route daily 3/15/22   Rhonda Vargas DO   clobetasol (TEMOVATE) 0.05 % cream Apply topically 2 times daily. 2/10/22   Rhonda Vargas DO   phenylephrine-cocoa butter (PREPARATION H) 0.25-88.44 % Place 1 suppository rectally as needed for Hemorrhoids 2/10/22   Pal Orozco MD   ziprasidone (GEODON) 60 MG capsule TAKE TWO CAPSULES BY MOUTH TWO TIMES A DAY 10/7/21   Historical Provider, MD   lisinopril (PRINIVIL;ZESTRIL) 10 MG tablet Take 1 tablet by mouth daily 12/3/21   Gely Austin DO   vitamin D (ERGOCALCIFEROL) 1.25 MG (99659 UT) CAPS capsule TAKE ONE CAPSULE BY MOUTH ONCE A WEEK  Patient taking differently: Indications: every 21   Rhonda Vargas DO   triamcinolone (KENALOG) 0.1 % cream Apply topically 2 times daily Apply topically 2 times daily. Historical Provider, MD   lamoTRIgine (LAMICTAL) 200 MG tablet Take 200 mg by mouth daily Take 1 and a half tablets by mouth at bedtime and one half tablet at noon. Historical Provider, MD   dexmethylphenidate (FOCALIN) 10 MG tablet Take 10 mg by mouth 3 times daily.  Take 2 tablets by mouth in the morning, 2 tablets at noon, and 1 tablet at 3pm    Historical Provider, MD   Multiple Vitamin (MULTI-VITAMIN DAILY PO) Take by mouth    Historical Provider, MD       Current medications:    No current facility-administered medications for this encounter. Allergies:  No Known Allergies    Problem List:    Patient Active Problem List   Diagnosis Code    Essential hypertension I10    Autism F84.0    Pervasive developmental disorder F84.9    Bipolar disease, chronic ([facility] [address] F31.9       Past Medical History:        Diagnosis Date    Autism     Bipolar 1 disorder ([facility] [address]     COVID 2020    Essential hypertension 5/4/2021    Rectal bleed 2022    Seizures ([facility] [address]        Past Surgical History:        Procedure Laterality Date    WISDOM TOOTH EXTRACTION  2020       Social History:    Social History     Tobacco Use    Smoking status: Never Smoker    Smokeless tobacco: Never Used   Substance Use Topics    Alcohol use: Yes     Comment: rarely                                Counseling given: Not Answered      Vital Signs (Current):   Vitals:    05/10/22 1245   Weight: (!) 306 lb (138.8 kg)   Height: 6' 6.5\" (1.994 m)                                              BP Readings from Last 3 Encounters:   03/15/22 136/84   02/10/22 138/76   02/10/22 124/84       NPO Status:                                                                                 BMI:   Wt Readings from Last 3 Encounters:   05/10/22 (!) 306 lb (138.8 kg)   03/15/22 (!) 312 lb (141.5 kg)   02/10/22 (!) 316 lb (143.3 kg)     Body mass index is 34.91 kg/m².     CBC:   Lab Results   Component Value Date    WBC 7.5 09/14/2021    RBC 4.94 09/14/2021    HGB 14.3 09/14/2021    HCT 42.6 09/14/2021    MCV 86.2 09/14/2021    RDW 13.2 09/14/2021     09/14/2021       CMP:   Lab Results   Component Value Date     09/14/2021    K 4.0 09/14/2021    CL 97 09/14/2021    CO2 21 09/14/2021    BUN 16 09/14/2021    CREATININE 0.9 09/14/2021    GFRAA >60 09/14/2021    LABGLOM >60 09/14/2021    GLUCOSE 90 09/14/2021    PROT 7.9 09/14/2021    CALCIUM 10.1 09/14/2021    BILITOT 0.6 09/14/2021    ALKPHOS 74 09/14/2021    AST 29 09/14/2021    ALT 34 09/14/2021       POC Tests: No results for input(s): POCGLU, POCNA, POCK, POCCL, POCBUN, POCHEMO, POCHCT in the last 72 hours. Coags: No results found for: PROTIME, INR, APTT    HCG (If Applicable): No results found for: PREGTESTUR, PREGSERUM, HCG, HCGQUANT     ABGs: No results found for: PHART, PO2ART, MRN6VQH, SLU0CGO, BEART, F9SDSWMS     Type & Screen (If Applicable):  No results found for: LABABO, LABRH    Drug/Infectious Status (If Applicable):  No results found for: HIV, HEPCAB    COVID-19 Screening (If Applicable): No results found for: COVID19        Anesthesia Evaluation  Patient summary reviewed and Nursing notes reviewed no history of anesthetic complications:   Airway: Mallampati: II  TM distance: >3 FB   Neck ROM: full  Mouth opening: > = 3 FB Dental: normal exam         Pulmonary:Negative Pulmonary ROS breath sounds clear to auscultation      (-) not a current smoker                           Cardiovascular:  Exercise tolerance: good (>4 METS),   (+) hypertension:,         Rhythm: regular  Rate: normal           Beta Blocker:  Not on Beta Blocker         Neuro/Psych:   (+) seizures: well controlled, psychiatric history:depression/anxiety             GI/Hepatic/Renal:   (+) bowel prep,          ROS comment: GI BLEED. Endo/Other: Negative Endo/Other ROS                    Abdominal:             Vascular: negative vascular ROS. Other Findings:             Anesthesia Plan      MAC     ASA 3       Induction: intravenous. Anesthetic plan and risks discussed with patient.                       Joe Celaya MD   5/13/2022    Note reviewed and agree with plan LUPIS Venegas - CRNA

## 2022-05-13 NOTE — OP NOTE
Colonoscopy Op Note  PATIENT: Chuy Baez    DATE OF PROCEDURE: 5/13/2022    SURGEON: Emiliana Emerson MD    PREOPERATIVE DIAGNOSIS: Diagnostic colonoscopy for Rectal bleeding    POSTOPERATIVE DIAGNOSIS: Same, grade 1 hemorrhoids    OPERATION: Procedure(s):  COLONOSCOPY DIAGNOSTIC    ANESTHESIA: Local monitored anesthesia. ESTIMATED BLOOD LOSS: nil     COMPLICATIONS: None. SPECIMENS:   * No specimens in log *    HISTORY: The patient is a 32y.o. year old male with history of above preop diagnosis. I recommended colonoscopy with possible biopsy or polypectomy and I explained the risk, benefits, expected outcome, and alternatives to the procedure. Risks included but are not limited to bleeding, infection, respiratory distress, hypotension, and perforation of the colon. The patient understands and is in agreement. PROCEDURE: The patient was given IV conscious sedation per anesthesia. The patient was given supplemental oxygen by nasal cannula. The colonoscope was inserted per rectum and advanced under direct vision to the cecum without difficulty, identified by appendiceal orifice and ileocecal valve. The prep was good so exam was adequate. FINDINGS:    MODESTA: Minimal internal hemorrhoids, no evidence of fissure    Terminal Ileum: Single superficial erosion in the TI    Colon: normal    Rectum/Anus: examined in normal and retroflexed positions -grade 1 hemorrhoids. The colon was decompressed and the scope was removed. The withdraw time was approximately 7 minutes. The patient tolerated the procedure well. ASSESSMENT/PLAN:   1. Local care for hemorrhoids.   2. Colorectal Cancer Screening - recommend repeat colonoscopy at normal screening age    Emiliana Emerson MD  05/13/22  12:58 PM

## 2022-05-31 ENCOUNTER — OFFICE VISIT (OUTPATIENT)
Dept: FAMILY MEDICINE CLINIC | Age: 27
End: 2022-05-31
Payer: MEDICARE

## 2022-05-31 VITALS
WEIGHT: 315 LBS | HEIGHT: 78 IN | OXYGEN SATURATION: 98 % | DIASTOLIC BLOOD PRESSURE: 90 MMHG | TEMPERATURE: 97.8 F | RESPIRATION RATE: 18 BRPM | HEART RATE: 98 BPM | BODY MASS INDEX: 36.45 KG/M2 | SYSTOLIC BLOOD PRESSURE: 124 MMHG

## 2022-05-31 DIAGNOSIS — R31.9 HEMATURIA, UNSPECIFIED TYPE: ICD-10-CM

## 2022-05-31 DIAGNOSIS — R31.9 HEMATURIA, UNSPECIFIED TYPE: Primary | ICD-10-CM

## 2022-05-31 LAB
ALBUMIN SERPL-MCNC: 4.5 G/DL (ref 3.5–5.2)
ALP BLD-CCNC: 88 U/L (ref 40–129)
ALT SERPL-CCNC: 21 U/L (ref 0–40)
ANION GAP SERPL CALCULATED.3IONS-SCNC: 11 MMOL/L (ref 7–16)
AST SERPL-CCNC: 22 U/L (ref 0–39)
BASOPHILS ABSOLUTE: 0.05 E9/L (ref 0–0.2)
BASOPHILS RELATIVE PERCENT: 0.6 % (ref 0–2)
BILIRUB SERPL-MCNC: <0.2 MG/DL (ref 0–1.2)
BILIRUBIN, POC: NORMAL
BLOOD URINE, POC: NEGATIVE
BUN BLDV-MCNC: 13 MG/DL (ref 6–20)
CALCIUM SERPL-MCNC: 9.1 MG/DL (ref 8.6–10.2)
CHLORIDE BLD-SCNC: 101 MMOL/L (ref 98–107)
CLARITY, POC: CLEAR
CO2: 25 MMOL/L (ref 22–29)
COLOR, POC: YELLOW
CREAT SERPL-MCNC: 0.7 MG/DL (ref 0.7–1.2)
EOSINOPHILS ABSOLUTE: 0.21 E9/L (ref 0.05–0.5)
EOSINOPHILS RELATIVE PERCENT: 2.7 % (ref 0–6)
GFR AFRICAN AMERICAN: >60
GFR NON-AFRICAN AMERICAN: >60 ML/MIN/1.73
GLUCOSE BLD-MCNC: 93 MG/DL (ref 74–99)
GLUCOSE URINE, POC: NORMAL
HCT VFR BLD CALC: 44.4 % (ref 37–54)
HEMOGLOBIN: 14.4 G/DL (ref 12.5–16.5)
IMMATURE GRANULOCYTES #: 0.02 E9/L
IMMATURE GRANULOCYTES %: 0.3 % (ref 0–5)
KETONES, POC: NORMAL
LEUKOCYTE EST, POC: NORMAL
LYMPHOCYTES ABSOLUTE: 2.27 E9/L (ref 1.5–4)
LYMPHOCYTES RELATIVE PERCENT: 28.7 % (ref 20–42)
MCH RBC QN AUTO: 28.4 PG (ref 26–35)
MCHC RBC AUTO-ENTMCNC: 32.4 % (ref 32–34.5)
MCV RBC AUTO: 87.6 FL (ref 80–99.9)
MONOCYTES ABSOLUTE: 0.66 E9/L (ref 0.1–0.95)
MONOCYTES RELATIVE PERCENT: 8.4 % (ref 2–12)
NEUTROPHILS ABSOLUTE: 4.69 E9/L (ref 1.8–7.3)
NEUTROPHILS RELATIVE PERCENT: 59.3 % (ref 43–80)
NITRITE, POC: NORMAL
PDW BLD-RTO: 13.4 FL (ref 11.5–15)
PH, POC: 5.5
PLATELET # BLD: 274 E9/L (ref 130–450)
PMV BLD AUTO: 10 FL (ref 7–12)
POTASSIUM SERPL-SCNC: 4.8 MMOL/L (ref 3.5–5)
PROTEIN, POC: NORMAL
RBC # BLD: 5.07 E12/L (ref 3.8–5.8)
SODIUM BLD-SCNC: 137 MMOL/L (ref 132–146)
SPECIFIC GRAVITY, POC: >=1.03
TOTAL CK: 120 U/L (ref 20–200)
TOTAL PROTEIN: 7.9 G/DL (ref 6.4–8.3)
UROBILINOGEN, POC: 0.2
WBC # BLD: 7.9 E9/L (ref 4.5–11.5)

## 2022-05-31 PROCEDURE — 1036F TOBACCO NON-USER: CPT | Performed by: PHYSICIAN ASSISTANT

## 2022-05-31 PROCEDURE — G8417 CALC BMI ABV UP PARAM F/U: HCPCS | Performed by: PHYSICIAN ASSISTANT

## 2022-05-31 PROCEDURE — 81002 URINALYSIS NONAUTO W/O SCOPE: CPT | Performed by: PHYSICIAN ASSISTANT

## 2022-05-31 PROCEDURE — G8427 DOCREV CUR MEDS BY ELIG CLIN: HCPCS | Performed by: PHYSICIAN ASSISTANT

## 2022-05-31 PROCEDURE — 99214 OFFICE O/P EST MOD 30 MIN: CPT | Performed by: PHYSICIAN ASSISTANT

## 2022-05-31 NOTE — PROGRESS NOTES
22  Chuy Baze : 1995 Sex: male  Age 32 y.o. Subjective:  Chief Complaint   Patient presents with    Hematuria         HPI:   Chuy Baez , 32 y.o. male presents to express care for evaluation of blood in urine    HPI  80-year-old male presents to express care for evaluation of possible blood in the urine. The patient has noted some blood in his urine as well as in his underwear. The patient states that he thought it was because he was dehydrated and he was not drinking enough water. He drinks quite a bit of pop. The patient states that his urine has also been quite pungent. The patient was concerned that he may have a urinary tract infection. The patient is not sexually active but states that he does do a lot of masturbation. The patient is not having any urethral drainage or discharge. The patient has not any back pain or flank pain. ROS:   Unless otherwise stated in this report the patient's positive and negative responses for review of systems for constitutional, eyes, ENT, cardiovascular, respiratory, gastrointestinal, neurological, , musculoskeletal, and integument systems and related systems to the presenting problem are either stated in the history of present illness or were not pertinent or were negative for the symptoms and/or complaints related to the presenting medical problem. Positives and pertinent negatives as per HPI. All others reviewed and are negative. PMH:     Past Medical History:   Diagnosis Date    Autism     Bipolar 1 disorder ([facility] [address]     COVID 2020    Essential hypertension 2021    Rectal bleed     Seizures ([facility] [address]        Past Surgical History:   Procedure Laterality Date    COLONOSCOPY N/A 2022    COLONOSCOPY DIAGNOSTIC performed by Emiliana Emerson MD at [address] [place] [address]       History reviewed. No pertinent family history.     Medications:     Current Outpatient Medications:    lb (144.2 kg)   Height: 6' 6.5\" (1.994 m)       Exam:  Physical Exam  Nurses note and vital signs reviewed and patient is not hypoxic. General: The patient appears well and in no apparent distress. Patient is resting comfortably on cart. Skin: Warm, dry, no pallor noted. There is no rash noted. Head: Normocephalic, atraumatic. Eye: Normal conjunctiva  Ears, Nose, Mouth, and Throat: Wearing a mask  Cardiovascular: Regular Rate and Rhythm  Respiratory: Patient is in no distress, no accessory muscle use, lungs are clear to auscultation, no wheezing, crackles or rhonchi  Back: Non-tender, no CVA tenderness bilaterally to percussion. GI: Normal bowel sounds, no tenderness to palpation, no masses appreciated. No rebound, guarding, or rigidity noted.   Neurological: A&O x4, normal speech        Testing:     Results for orders placed or performed in visit on 05/31/22   POCT Urinalysis no Micro   Result Value Ref Range    Color, UA yellow     Clarity, UA Clear     Glucose, UA POC Neg     Bilirubin, UA Neg     Ketones, UA Neg     Spec Grav, UA >=1.030     Blood, UA POC Negative     pH, UA 5.5     Protein, UA POC Neg     Urobilinogen, UA 0.2     Leukocytes, UA Neg     Nitrite, UA Neg      Component      Latest Ref Rng & Units 5/31/2022 5/31/2022 5/31/2022           1:33 PM  1:33 PM  1:33 PM   WBC      4.5 - 11.5 E9/L   7.9   RBC      3.80 - 5.80 E12/L   5.07   Hemoglobin Quant      12.5 - 16.5 g/dL   14.4   Hematocrit      37.0 - 54.0 %   44.4   MCV      80.0 - 99.9 fL   87.6   MCH      26.0 - 35.0 pg   28.4   MCHC      32.0 - 34.5 %   32.4   RDW      11.5 - 15.0 fL   13.4   Platelet Count      072 - 450 E9/L   274   MPV      7.0 - 12.0 fL   10.0   Neutrophils %      43.0 - 80.0 %   59.3   Immature Granulocytes %      0.0 - 5.0 %   0.3   Lymphocyte %      20.0 - 42.0 %   28.7   Monocytes %      2.0 - 12.0 %   8.4   Eosinophils %      0.0 - 6.0 %   2.7   Basophils %      0.0 - 2.0 %   0.6   Neutrophils Absolute      1.80 - 7.30 E9/L   4.69   Immature Granulocytes #      E9/L   0.02   Lymphocytes Absolute      1.50 - 4.00 E9/L   2.27   Monocytes Absolute      0.10 - 0.95 E9/L   0.66   Eosinophils Absolute      0.05 - 0.50 E9/L   0.21   Basophils Absolute      0.00 - 0.20 E9/L   0.05   Sodium      132 - 146 mmol/L 137     Potassium      3.5 - 5.0 mmol/L 4.8     Chloride      98 - 107 mmol/L 101     CO2      22 - 29 mmol/L 25     Anion Gap      7 - 16 mmol/L 11     GLUCOSE, FASTING,GF      74 - 99 mg/dL 93     BUN,BUNPL      6 - 20 mg/dL 13     Creatinine      0.7 - 1.2 mg/dL 0.7     GFR Non-African American      >=60 mL/min/1.73 >60     GFR        >60     CALCIUM, SERUM, 880772      8.6 - 10.2 mg/dL 9.1     Total Protein      6.4 - 8.3 g/dL 7.9     Albumin      3.5 - 5.2 g/dL 4.5     Bilirubin      0.0 - 1.2 mg/dL <0.2     Alk Phos      40 - 129 U/L 88     ALT      0 - 40 U/L 21     AST      0 - 39 U/L 22     Total CK      20 - 200 U/L  120          Medical Decision Making:     Vital signs reviewed    Past medical history reviewed. Allergies reviewed. Medications reviewed. Patient on arrival does not appear to be in any apparent distress or discomfort. The patient has been seen and evaluated. The patient does not appear to be toxic or lethargic. The patient had urinalysis obtained that was essentially unremarkable. Did show elevated specific gravity but there was no blood or leukocytes or nitrates present. There is no significant pungent odor to the urine. The patient will have a urine culture set up. We will send the patient for laboratory studies. The patient will monitor symptoms closely. Continue to hydrate. Patient will await culture and we will contact him with the results. Laboratory studies were unremarkable. Normal renal function. Normal CBC. The patient had no significant elevation of CK.     The patient is to return to express care or go directly to the emergency department should any of the signs or symptoms worsen. The patient is to followup with primary care physician in 2-3 days for repeat evaluation. The patient has no other questions or concerns at this time the patient will be discharged home. Clinical Impression:   Kimberly Anne was seen today for hematuria. Diagnoses and all orders for this visit:    Hematuria, unspecified type  -     POCT Urinalysis no Micro  -     Culture, Urine; Future  -     CBC with Auto Differential; Future  -     Comprehensive Metabolic Panel; Future  -     CK; Future        The patient is to call for any concerns or return if any of the signs or symptoms worsen. The patient is to follow-up with PCP in the next 2-3 days for repeat evaluation repeat assessment or go directly to the emergency department.      SIGNATURE: Pawel Canseco III, PA-C

## 2022-06-01 NOTE — RESULT ENCOUNTER NOTE
Laboratory studies are all noted to be within normal limits. No explanation for the patient having blood in his urine.   I would have the patient follow-up with his PCP, continue to hydrate, we are awaiting urine culture

## 2022-06-03 ENCOUNTER — TELEPHONE (OUTPATIENT)
Dept: PRIMARY CARE CLINIC | Age: 27
End: 2022-06-03

## 2022-06-03 DIAGNOSIS — I10 ESSENTIAL HYPERTENSION: ICD-10-CM

## 2022-06-03 LAB — URINE CULTURE, ROUTINE: NORMAL

## 2022-06-03 NOTE — TELEPHONE ENCOUNTER
Patient also calling back that he has been urinating more frequently, dark colored urine.  Patient notified that urine culture was normal

## 2022-06-03 NOTE — TELEPHONE ENCOUNTER
Pt calling c/o fatigue all the time and he is sleeping 12-13hrs of sleep qd. He works 3 days.  He is unsure if its med related

## 2022-06-03 NOTE — TELEPHONE ENCOUNTER
All of the labs that were ordered through [facility] were normal and labs I did in September were normal.  Doubt anything metabolic. I would recommend he check with Psych about medications. Urine tests were all normal.  I would recommend increasing fluid hydration. 48-64 oz of water daily.

## 2022-06-06 RX ORDER — LISINOPRIL 10 MG/1
10 TABLET ORAL DAILY
Qty: 90 TABLET | Refills: 1 | Status: SHIPPED
Start: 2022-06-06 | End: 2022-08-25 | Stop reason: SDUPTHER

## 2022-06-14 ENCOUNTER — OFFICE VISIT (OUTPATIENT)
Dept: FAMILY MEDICINE CLINIC | Age: 27
End: 2022-06-14
Payer: MEDICARE

## 2022-06-14 VITALS
RESPIRATION RATE: 18 BRPM | TEMPERATURE: 97.6 F | HEIGHT: 78 IN | WEIGHT: 315 LBS | BODY MASS INDEX: 36.45 KG/M2 | SYSTOLIC BLOOD PRESSURE: 138 MMHG | OXYGEN SATURATION: 96 % | DIASTOLIC BLOOD PRESSURE: 92 MMHG | HEART RATE: 92 BPM

## 2022-06-14 DIAGNOSIS — S40.821A: Primary | ICD-10-CM

## 2022-06-14 DIAGNOSIS — L08.9: Primary | ICD-10-CM

## 2022-06-14 PROCEDURE — 99213 OFFICE O/P EST LOW 20 MIN: CPT | Performed by: PHYSICIAN ASSISTANT

## 2022-06-14 PROCEDURE — G8417 CALC BMI ABV UP PARAM F/U: HCPCS | Performed by: PHYSICIAN ASSISTANT

## 2022-06-14 PROCEDURE — 1036F TOBACCO NON-USER: CPT | Performed by: PHYSICIAN ASSISTANT

## 2022-06-14 PROCEDURE — G8427 DOCREV CUR MEDS BY ELIG CLIN: HCPCS | Performed by: PHYSICIAN ASSISTANT

## 2022-06-14 RX ORDER — SULFAMETHOXAZOLE AND TRIMETHOPRIM 800; 160 MG/1; MG/1
1 TABLET ORAL 2 TIMES DAILY
Qty: 20 TABLET | Refills: 0 | Status: SHIPPED | OUTPATIENT
Start: 2022-06-14 | End: 2022-06-24

## 2022-06-14 RX ORDER — CEPHALEXIN 500 MG/1
500 CAPSULE ORAL 4 TIMES DAILY
Qty: 40 CAPSULE | Refills: 0 | Status: SHIPPED
Start: 2022-06-14 | End: 2022-08-16

## 2022-06-14 NOTE — PROGRESS NOTES
22  Annamaria Brown : 1995 Sex: male  Age 32 y.o. Subjective:  Chief Complaint   Patient presents with    Wound Check     Right arm         HPI:   Annamaria Brown , 32 y.o. male presents to express care for evaluation of wound evaluation    HPI  19-year-old male presents to express care for evaluation of a wound evaluation. The patient has noted this wound to the right shoulder area for about a week now. The patient has been cleaning it with peroxide multiple times a day as well as placing Neosporin. The patient also sustained some irritation from using tape. The patient has not any fever, chills. Patient unsure specifically where he got it but it started off as a small pimple and has now turned into the circumferential blisterlike area. The patient has some yellow drainage at times. Is not draining currently. The patient has not any fever, chills. No history of IV drug use. ROS:   Unless otherwise stated in this report the patient's positive and negative responses for review of systems for constitutional, eyes, ENT, cardiovascular, respiratory, gastrointestinal, neurological, , musculoskeletal, and integument systems and related systems to the presenting problem are either stated in the history of present illness or were not pertinent or were negative for the symptoms and/or complaints related to the presenting medical problem. Positives and pertinent negatives as per HPI. All others reviewed and are negative. PMH:     Past Medical History:   Diagnosis Date    Autism     [facility] [address]     COVID 2020    Essential hypertension 2021    Rectal bleed     Seizures ([facility] [address]        Past Surgical History:   Procedure Laterality Date    COLONOSCOPY N/A 2022    COLONOSCOPY DIAGNOSTIC performed by Rahul Borja MD at [address] [place] [address]       History reviewed. No pertinent family history.     Medications:     Current Outpatient Medications:     sulfamethoxazole-trimethoprim (BACTRIM DS;SEPTRA DS) 800-160 MG per tablet, Take 1 tablet by mouth 2 times daily for 10 days, Disp: 20 tablet, Rfl: 0    cephALEXin (KEFLEX) 500 MG capsule, Take 1 capsule by mouth 4 times daily, Disp: 40 capsule, Rfl: 0    mupirocin (BACTROBAN) 2 % ointment, Apply topically 3 times daily. , Disp: 30 g, Rfl: 0    lisinopril (PRINIVIL;ZESTRIL) 10 MG tablet, Take 1 tablet by mouth daily, Disp: 90 tablet, Rfl: 1    baclofen (LIORESAL) 10 MG tablet, Take 10 mg by mouth 3 times daily as needed, Disp: , Rfl:     cloNIDine (CATAPRES) 0.3 MG tablet, TAKE ONE TABLET BY MOUTH AT BEDTIME, Disp: , Rfl:     fluticasone (FLONASE) 50 MCG/ACT nasal spray, 1 spray by Each Nostril route daily, Disp: 16 g, Rfl: 5    clobetasol (TEMOVATE) 0.05 % cream, Apply topically 2 times daily. , Disp: 60 g, Rfl: 1    phenylephrine-cocoa butter (PREPARATION H) 0.25-88.44 %, Place 1 suppository rectally as needed for Hemorrhoids, Disp: 12 suppository, Rfl: 2    ziprasidone (GEODON) 60 MG capsule, TAKE TWO CAPSULES BY MOUTH TWO TIMES A DAY, Disp: , Rfl:     vitamin D (ERGOCALCIFEROL) 1.25 MG (17708 UT) CAPS capsule, TAKE ONE CAPSULE BY MOUTH ONCE A WEEK (Patient taking differently: Indications: every sunday ), Disp: 12 capsule, Rfl: 0    triamcinolone (KENALOG) 0.1 % cream, Apply topically 2 times daily Apply topically 2 times daily. , Disp: , Rfl:     lamoTRIgine (LAMICTAL) 200 MG tablet, Take 200 mg by mouth daily Take 1 and a half tablets by mouth at bedtime and one half tablet at noon., Disp: , Rfl:     dexmethylphenidate (FOCALIN) 10 MG tablet, Take 10 mg by mouth 3 times daily.  Take 2 tablets by mouth in the morning, 2 tablets at noon, and 1 tablet at 3pm, Disp: , Rfl:     Multiple Vitamin (MULTI-VITAMIN DAILY PO), Take by mouth, Disp: , Rfl:     Allergies:   No Known Allergies    Social History:     Social History     Tobacco Use    Smoking status: Never Smoker    Smokeless tobacco: Never Used   Substance Use Topics    Alcohol use: Yes     Comment: rarely    Drug use: Not Currently       Patient lives at home. Physical Exam:     Vitals:    06/14/22 1104   BP: (!) 138/92   Site: Right Upper Arm   Position: Sitting   Cuff Size: Medium Adult   Pulse: 92   Resp: 18   Temp: 97.6 °F (36.4 °C)   TempSrc: Temporal   SpO2: 96%   Weight: (!) 315 lb (142.9 kg)   Height: 6' 6.5\" (1.994 m)       Exam:  Physical Exam  Vital signs reviewed and nurse's notes. The patient is not hypoxic. General: Alert, no acute distress, patient resting comfortably   Skin: warm, intact, no pallor noted   Head: Normocephalic, atraumatic   Eye: Normal conjunctiva   Respiratory: No acute distress   Musculoskeletal: The patient has a circumferential area that is approximately 3 cm in diameter. There is evidence of some granulation tissue and erythema around the area. The patient has no evidence of gross cellulitis or lymphangitic streaking. There is no fluctuance or induration. The patient has no other involvement to the upper extremity or to the forearm area. There is normal pulses at radius 2+. Normal capillary refill. No cyanosis or mottling. Neurological: alert and orient x4, normal sensory and motor observed. Psychiatric: Cooperative      Testing:           Medical Decision Making:     Vital signs reviewed    Past medical history reviewed. Allergies reviewed. Medications reviewed. Patient on arrival does not appear to be in any apparent distress or discomfort. The patient has been seen and evaluated. The patient does not appear to be toxic or lethargic. The patient will be treated with Bactrim DS and cephalexin. The patient will be given mupirocin. The patient will hold on using any further topical agents or cleaning agents other than just soap and water. While the patient follow-up with PCP. Patient is to return if any of the signs or symptoms change or worsen.   The patient was comfortable with this plan. The patient is to return to express care or go directly to the emergency department should any of the signs or symptoms worsen. The patient is to followup with primary care physician in 2-3 days for repeat evaluation. The patient has no other questions or concerns at this time the patient will be discharged home. Clinical Impression:   Gab Bell was seen today for wound check. Diagnoses and all orders for this visit:    Blister of right upper arm with infection, initial encounter    Other orders  -     Wound care  -     sulfamethoxazole-trimethoprim (BACTRIM DS;SEPTRA DS) 800-160 MG per tablet; Take 1 tablet by mouth 2 times daily for 10 days  -     cephALEXin (KEFLEX) 500 MG capsule; Take 1 capsule by mouth 4 times daily  -     mupirocin (BACTROBAN) 2 % ointment; Apply topically 3 times daily. The patient is to call for any concerns or return if any of the signs or symptoms worsen. The patient is to follow-up with PCP in the next 2-3 days for repeat evaluation repeat assessment or go directly to the emergency department.      SIGNATURE: Preet Bella III, PA-C

## 2022-07-01 ENCOUNTER — TELEPHONE (OUTPATIENT)
Dept: PRIMARY CARE CLINIC | Age: 27
End: 2022-07-01

## 2022-07-01 DIAGNOSIS — I83.91 ASYMPTOMATIC VARICOSE VEINS OF RIGHT LOWER EXTREMITY: Primary | ICD-10-CM

## 2022-07-01 NOTE — TELEPHONE ENCOUNTER
Patient calling asking if you could refer to vascular surgeon. Asymptomatic varicose veins of right lower extremity was mentioned in the last office note. Stats that when he is standing up on legs for a while , he veins get puffy.      Patient's mom see Dr. Belkis Polk

## 2022-07-01 NOTE — TELEPHONE ENCOUNTER
I can refer him to vascular, but if it is not causing pain, they are just going to tell him to wear a compression sock.

## 2022-08-16 ENCOUNTER — TELEMEDICINE (OUTPATIENT)
Dept: PRIMARY CARE CLINIC | Age: 27
End: 2022-08-16
Payer: MEDICARE

## 2022-08-16 DIAGNOSIS — I10 ESSENTIAL HYPERTENSION: Primary | ICD-10-CM

## 2022-08-16 DIAGNOSIS — R73.01 IMPAIRED FASTING GLUCOSE: ICD-10-CM

## 2022-08-16 DIAGNOSIS — F84.9 PERVASIVE DEVELOPMENTAL DISORDER: ICD-10-CM

## 2022-08-16 DIAGNOSIS — F31.9 BIPOLAR DISEASE, CHRONIC (HCC): ICD-10-CM

## 2022-08-16 DIAGNOSIS — E55.9 VITAMIN D INSUFFICIENCY: ICD-10-CM

## 2022-08-16 DIAGNOSIS — F84.0 AUTISM: ICD-10-CM

## 2022-08-16 PROCEDURE — G8427 DOCREV CUR MEDS BY ELIG CLIN: HCPCS | Performed by: FAMILY MEDICINE

## 2022-08-16 PROCEDURE — 99214 OFFICE O/P EST MOD 30 MIN: CPT | Performed by: FAMILY MEDICINE

## 2022-08-16 ASSESSMENT — PATIENT HEALTH QUESTIONNAIRE - PHQ9
2. FEELING DOWN, DEPRESSED OR HOPELESS: 0
SUM OF ALL RESPONSES TO PHQ QUESTIONS 1-9: 9
5. POOR APPETITE OR OVEREATING: 1
10. IF YOU CHECKED OFF ANY PROBLEMS, HOW DIFFICULT HAVE THESE PROBLEMS MADE IT FOR YOU TO DO YOUR WORK, TAKE CARE OF THINGS AT HOME, OR GET ALONG WITH OTHER PEOPLE: 0
7. TROUBLE CONCENTRATING ON THINGS, SUCH AS READING THE NEWSPAPER OR WATCHING TELEVISION: 3
SUM OF ALL RESPONSES TO PHQ QUESTIONS 1-9: 9
6. FEELING BAD ABOUT YOURSELF - OR THAT YOU ARE A FAILURE OR HAVE LET YOURSELF OR YOUR FAMILY DOWN: 1
SUM OF ALL RESPONSES TO PHQ QUESTIONS 1-9: 9
SUM OF ALL RESPONSES TO PHQ QUESTIONS 1-9: 9
9. THOUGHTS THAT YOU WOULD BE BETTER OFF DEAD, OR OF HURTING YOURSELF: 0
8. MOVING OR SPEAKING SO SLOWLY THAT OTHER PEOPLE COULD HAVE NOTICED. OR THE OPPOSITE, BEING SO FIGETY OR RESTLESS THAT YOU HAVE BEEN MOVING AROUND A LOT MORE THAN USUAL: 0
3. TROUBLE FALLING OR STAYING ASLEEP: 1
1. LITTLE INTEREST OR PLEASURE IN DOING THINGS: 0
SUM OF ALL RESPONSES TO PHQ9 QUESTIONS 1 & 2: 0
4. FEELING TIRED OR HAVING LITTLE ENERGY: 3

## 2022-08-16 ASSESSMENT — ENCOUNTER SYMPTOMS
CONSTIPATION: 0
WHEEZING: 0
NAUSEA: 0
COUGH: 0
BACK PAIN: 0
DIARRHEA: 0
VOMITING: 0
ABDOMINAL PAIN: 0
SHORTNESS OF BREATH: 0

## 2022-08-16 NOTE — PROGRESS NOTES
22  Faustino Parikh : 1995 Sex: male  Age: 32 y.o. Chief Complaint   Patient presents with    Hypertension    Anxiety       HPI:  32 y.o. male presents today for follow up of chronic medical conditions. Patient's chart, medical, surgical and medication history all reviewed. TeleMedicine Patient Consent    This visit was performed as a virtual video visit using a synchronous, two-way, audio-video telehealth technology platform. Patient identification was verified at the start of the visit, including the patient's telephone number and physical location. I discussed with the patient the nature of our telehealth visits, that:     Due to the nature of an audio- video modality, the only components of a physical exam that could be done are the elements supported by direct observation. I would evaluate the patient and recommend diagnostics and treatments based on my assessment. If it was felt that the patient should be evaluated in clinic or an emergency room setting, then they would be directed there. Our sessions are not being recorded and that personal health information is protected. Our team would provide follow up care in person if/when the patient needs it. Patient does agree to proceed with telemedicine consultation. Patient's location: home address in [facility]. Physician location: home address in [facility]. Other people involved in call:  Elige Benito, [place]. Time spent: Greater than Not billed by time    This visit was completed virtually using D[employer]. me      Hypertension   The patient presents today for follow up of HTN. The problem is  unable to determine via VV . Risk factors for coronary artery disease include male, HTN and family Hx. Current treatments include lisinopril (Prinivil). The patient is compliant all of the time. Lifestyle changes the patient has made include  none . Today the patient is complaining of none.         Patient has a long standing history of autism and bipolar Left Ear: Hearing and external ear normal.      Nose: Nose normal.   Eyes:      General: Lids are normal. No scleral icterus. Extraocular Movements: Extraocular movements intact. Conjunctiva/sclera: Conjunctivae normal.   Pulmonary:      Effort: Pulmonary effort is normal. No respiratory distress. Breath sounds: No wheezing. Musculoskeletal:         General: Normal range of motion. Cervical back: Normal range of motion. Skin:     Findings: No rash. Neurological:      Mental Status: He is alert and oriented to person, place, and time. Psychiatric:         Mood and Affect: Affect normal. Mood is anxious. Speech: Speech is rapid and pressured and tangential.         Behavior: Behavior is hyperactive. Thought Content:  Thought content normal.       Labs:  CBC with Differential:    Lab Results   Component Value Date/Time    WBC 7.9 05/31/2022 01:33 PM    RBC 5.07 05/31/2022 01:33 PM    HGB 14.4 05/31/2022 01:33 PM    HCT 44.4 05/31/2022 01:33 PM     05/31/2022 01:33 PM    MCV 87.6 05/31/2022 01:33 PM    MCH 28.4 05/31/2022 01:33 PM    MCHC 32.4 05/31/2022 01:33 PM    RDW 13.4 05/31/2022 01:33 PM    LYMPHOPCT 28.7 05/31/2022 01:33 PM    MONOPCT 8.4 05/31/2022 01:33 PM    BASOPCT 0.6 05/31/2022 01:33 PM    MONOSABS 0.66 05/31/2022 01:33 PM    LYMPHSABS 2.27 05/31/2022 01:33 PM    EOSABS 0.21 05/31/2022 01:33 PM    BASOSABS 0.05 05/31/2022 01:33 PM     CMP:    Lab Results   Component Value Date/Time     05/31/2022 01:33 PM    K 4.8 05/31/2022 01:33 PM     05/31/2022 01:33 PM    CO2 25 05/31/2022 01:33 PM    BUN 13 05/31/2022 01:33 PM    CREATININE 0.7 05/31/2022 01:33 PM    GFRAA >60 05/31/2022 01:33 PM    LABGLOM >60 05/31/2022 01:33 PM    GLUCOSE 93 05/31/2022 01:33 PM    PROT 7.9 05/31/2022 01:33 PM    LABALBU 4.5 05/31/2022 01:33 PM    CALCIUM 9.1 05/31/2022 01:33 PM    BILITOT <0.2 05/31/2022 01:33 PM    ALKPHOS 88 05/31/2022 01:33 PM    AST 22 05/31/2022 01:33 PM    ALT 21 05/31/2022 01:33 PM     U/A:    Lab Results   Component Value Date/Time    NITRITE Neg 05/31/2022 01:16 PM    COLORU yellow 05/31/2022 01:16 PM    COLORU Yellow 09/14/2021 11:41 AM    PROTEINU Neg 05/31/2022 01:16 PM    PROTEINU Negative 09/14/2021 11:41 AM    PHUR 5.5 05/31/2022 01:16 PM    PHUR 5.5 09/14/2021 11:41 AM    WBCUA 0-1 09/14/2021 11:41 AM    RBCUA 0-1 09/14/2021 11:41 AM    BACTERIA MODERATE 09/14/2021 11:41 AM    CLARITYU Clear 05/31/2022 01:16 PM    CLARITYU CLOUDY 09/14/2021 11:41 AM    SPECGRAV >=1.030 05/31/2022 01:16 PM    SPECGRAV >=1.030 09/14/2021 11:41 AM    LEUKOCYTESUR Neg 05/31/2022 01:16 PM    LEUKOCYTESUR Negative 09/14/2021 11:41 AM    UROBILINOGEN 0.2 09/14/2021 11:41 AM    BILIRUBINUR Neg 05/31/2022 01:16 PM    BLOODU Negative 05/31/2022 01:16 PM    BLOODU Negative 09/14/2021 11:41 AM    GLUCOSEU Neg 05/31/2022 01:16 PM    GLUCOSEU Negative 09/14/2021 11:41 AM    AMORPHOUS PRESENT 09/14/2021 11:41 AM     HgBA1c:    Lab Results   Component Value Date/Time    LABA1C 5.3 09/14/2021 11:42 AM     FLP:    Lab Results   Component Value Date/Time    TRIG 63 09/14/2021 11:42 AM    HDL 66 09/14/2021 11:42 AM    LDLCALC 134 09/14/2021 11:42 AM    LABVLDL 13 09/14/2021 11:42 AM     TSH:    Lab Results   Component Value Date/Time    TSH 4.200 09/14/2021 11:42 AM        Assessment and Plan:  Riky Ng was seen today for hypertension and anxiety. Diagnoses and all orders for this visit:    Essential hypertension  -     CBC with Auto Differential; Future  -     Comprehensive Metabolic Panel; Future  -     Lipid Panel; Future  -     TSH; Future  -     Urinalysis; Future  Unable to determine via VV. Will check at next visit in September. Due for labs in September. Bipolar disease, chronic ([facility] [address]  -     External Referral To Psychiatry  Will send referral to new psychiatrist.  Discussed medical marijuana as well.      Pervasive developmental disorder  -     External Referral To Psychiatry    Autism  -     External Referral To Psychiatry    Impaired fasting glucose  -     Hemoglobin A1C; Future    Vitamin D insufficiency  -     Vitamin D 25 Hydroxy; Future        Return in about 4 weeks (around 9/13/2022), or if symptoms worsen or fail to improve, for Chronic medical conditions.       Seen By:  Alondra Maldonado DO

## 2022-08-25 DIAGNOSIS — I10 ESSENTIAL HYPERTENSION: ICD-10-CM

## 2022-08-25 RX ORDER — ERGOCALCIFEROL 1.25 MG/1
50000 CAPSULE ORAL WEEKLY
Qty: 12 CAPSULE | Refills: 1 | Status: SHIPPED | OUTPATIENT
Start: 2022-08-25

## 2022-08-25 RX ORDER — LISINOPRIL 10 MG/1
10 TABLET ORAL DAILY
Qty: 90 TABLET | Refills: 1 | Status: SHIPPED | OUTPATIENT
Start: 2022-08-25

## 2022-09-21 ENCOUNTER — OFFICE VISIT (OUTPATIENT)
Dept: PRIMARY CARE CLINIC | Age: 27
End: 2022-09-21
Payer: MEDICARE

## 2022-09-21 VITALS
HEIGHT: 78 IN | WEIGHT: 311 LBS | DIASTOLIC BLOOD PRESSURE: 86 MMHG | TEMPERATURE: 97.8 F | HEART RATE: 76 BPM | BODY MASS INDEX: 35.98 KG/M2 | SYSTOLIC BLOOD PRESSURE: 138 MMHG | OXYGEN SATURATION: 96 %

## 2022-09-21 DIAGNOSIS — E55.9 VITAMIN D INSUFFICIENCY: ICD-10-CM

## 2022-09-21 DIAGNOSIS — I10 ESSENTIAL HYPERTENSION: Primary | ICD-10-CM

## 2022-09-21 DIAGNOSIS — E55.9 VITAMIN D DEFICIENCY: ICD-10-CM

## 2022-09-21 DIAGNOSIS — F84.9 PERVASIVE DEVELOPMENTAL DISORDER: ICD-10-CM

## 2022-09-21 DIAGNOSIS — R73.01 IMPAIRED FASTING GLUCOSE: ICD-10-CM

## 2022-09-21 DIAGNOSIS — I10 ESSENTIAL HYPERTENSION: ICD-10-CM

## 2022-09-21 DIAGNOSIS — F84.0 AUTISM: ICD-10-CM

## 2022-09-21 DIAGNOSIS — F31.9 BIPOLAR DISEASE, CHRONIC (HCC): ICD-10-CM

## 2022-09-21 DIAGNOSIS — Z23 NEED FOR INFLUENZA VACCINATION: ICD-10-CM

## 2022-09-21 LAB
ALBUMIN SERPL-MCNC: 4.5 G/DL (ref 3.5–5.2)
ALP BLD-CCNC: 87 U/L (ref 40–129)
ALT SERPL-CCNC: 23 U/L (ref 0–40)
ANION GAP SERPL CALCULATED.3IONS-SCNC: 12 MMOL/L (ref 7–16)
AST SERPL-CCNC: 22 U/L (ref 0–39)
BASOPHILS ABSOLUTE: 0.04 E9/L (ref 0–0.2)
BASOPHILS RELATIVE PERCENT: 0.5 % (ref 0–2)
BILIRUB SERPL-MCNC: 0.2 MG/DL (ref 0–1.2)
BILIRUBIN URINE: NEGATIVE
BLOOD, URINE: NEGATIVE
BUN BLDV-MCNC: 17 MG/DL (ref 6–20)
CALCIUM SERPL-MCNC: 9.8 MG/DL (ref 8.6–10.2)
CHLORIDE BLD-SCNC: 103 MMOL/L (ref 98–107)
CHOLESTEROL, TOTAL: 211 MG/DL (ref 0–199)
CLARITY: CLEAR
CO2: 25 MMOL/L (ref 22–29)
COLOR: YELLOW
CREAT SERPL-MCNC: 0.9 MG/DL (ref 0.7–1.2)
EOSINOPHILS ABSOLUTE: 0.18 E9/L (ref 0.05–0.5)
EOSINOPHILS RELATIVE PERCENT: 2.4 % (ref 0–6)
GFR AFRICAN AMERICAN: >60
GFR NON-AFRICAN AMERICAN: >60 ML/MIN/1.73
GLUCOSE BLD-MCNC: 95 MG/DL (ref 74–99)
GLUCOSE URINE: NEGATIVE MG/DL
HBA1C MFR BLD: 5.4 % (ref 4–5.6)
HCT VFR BLD CALC: 44.8 % (ref 37–54)
HDLC SERPL-MCNC: 55 MG/DL
HEMOGLOBIN: 14.7 G/DL (ref 12.5–16.5)
IMMATURE GRANULOCYTES #: 0.02 E9/L
IMMATURE GRANULOCYTES %: 0.3 % (ref 0–5)
KETONES, URINE: NEGATIVE MG/DL
LDL CHOLESTEROL CALCULATED: 145 MG/DL (ref 0–99)
LEUKOCYTE ESTERASE, URINE: NEGATIVE
LYMPHOCYTES ABSOLUTE: 2.24 E9/L (ref 1.5–4)
LYMPHOCYTES RELATIVE PERCENT: 29.6 % (ref 20–42)
MCH RBC QN AUTO: 29 PG (ref 26–35)
MCHC RBC AUTO-ENTMCNC: 32.8 % (ref 32–34.5)
MCV RBC AUTO: 88.4 FL (ref 80–99.9)
MONOCYTES ABSOLUTE: 0.56 E9/L (ref 0.1–0.95)
MONOCYTES RELATIVE PERCENT: 7.4 % (ref 2–12)
NEUTROPHILS ABSOLUTE: 4.52 E9/L (ref 1.8–7.3)
NEUTROPHILS RELATIVE PERCENT: 59.8 % (ref 43–80)
NITRITE, URINE: NEGATIVE
PDW BLD-RTO: 13.3 FL (ref 11.5–15)
PH UA: 6 (ref 5–9)
PLATELET # BLD: 296 E9/L (ref 130–450)
PMV BLD AUTO: 9.6 FL (ref 7–12)
POTASSIUM SERPL-SCNC: 4.5 MMOL/L (ref 3.5–5)
PROTEIN UA: NEGATIVE MG/DL
RBC # BLD: 5.07 E12/L (ref 3.8–5.8)
SODIUM BLD-SCNC: 140 MMOL/L (ref 132–146)
SPECIFIC GRAVITY UA: >=1.03 (ref 1–1.03)
TOTAL PROTEIN: 8.1 G/DL (ref 6.4–8.3)
TRIGL SERPL-MCNC: 53 MG/DL (ref 0–149)
TSH SERPL DL<=0.05 MIU/L-ACNC: 2.05 UIU/ML (ref 0.27–4.2)
UROBILINOGEN, URINE: 1 E.U./DL
VITAMIN D 25-HYDROXY: 29 NG/ML (ref 30–100)
VLDLC SERPL CALC-MCNC: 11 MG/DL
WBC # BLD: 7.6 E9/L (ref 4.5–11.5)

## 2022-09-21 PROCEDURE — G8427 DOCREV CUR MEDS BY ELIG CLIN: HCPCS | Performed by: FAMILY MEDICINE

## 2022-09-21 PROCEDURE — 90674 CCIIV4 VAC NO PRSV 0.5 ML IM: CPT | Performed by: FAMILY MEDICINE

## 2022-09-21 PROCEDURE — 99214 OFFICE O/P EST MOD 30 MIN: CPT | Performed by: FAMILY MEDICINE

## 2022-09-21 PROCEDURE — 1036F TOBACCO NON-USER: CPT | Performed by: FAMILY MEDICINE

## 2022-09-21 PROCEDURE — G0008 ADMIN INFLUENZA VIRUS VAC: HCPCS | Performed by: FAMILY MEDICINE

## 2022-09-21 PROCEDURE — G8417 CALC BMI ABV UP PARAM F/U: HCPCS | Performed by: FAMILY MEDICINE

## 2022-09-21 ASSESSMENT — ENCOUNTER SYMPTOMS
WHEEZING: 0
NAUSEA: 0
CONSTIPATION: 0
ABDOMINAL PAIN: 0
SHORTNESS OF BREATH: 0
COUGH: 0
BACK PAIN: 0
DIARRHEA: 0
VOMITING: 0

## 2022-09-21 NOTE — PROGRESS NOTES
22  Jim Castellanos : 1995 Sex: male  Age: 32 y.o. Chief Complaint   Patient presents with    Referral - General     Needs new psychologist. Pt states that his other psychologist will increase his medications and not tell him also forgets to fill his medications and he would just prefer to be referred to a new one. Immunizations     Would like to talk to [facility] about flu vaccine        HPI:  32 y.o. male presents today for follow up of chronic medical conditions. Patient's chart, medical, surgical and medication history all reviewed. Hypertension   The patient presents today for follow up of HTN. The problem is  unable to determine via VV . Risk factors for coronary artery disease include male, HTN and family Hx. Current treatments include lisinopril (Prinivil). The patient is compliant all of the time. Lifestyle changes the patient has made include  none . Today the patient is complaining of none. Patient has a long standing history of autism and bipolar disorder. Difficulty with anxiety. Follows with Dr. Robles Connolly for medication management. Reduced Geodon dose and stopped Risperdal.  Patient noting more sleepiness at our last visit. Now having issues with medication refills through Dr. Robles Connolly' office and would like to see a new psychiatrist.     ROS:  Review of Systems   Constitutional:  Negative for chills, fatigue and fever. Respiratory:  Negative for cough, shortness of breath and wheezing. Cardiovascular:  Negative for chest pain and palpitations. Gastrointestinal:  Negative for abdominal pain, constipation, diarrhea, nausea and vomiting. Musculoskeletal:  Negative for arthralgias and back pain. Skin:  Negative for rash. Neurological:  Negative for dizziness and headaches. Psychiatric/Behavioral:  Positive for behavioral problems and sleep disturbance. Negative for dysphoric mood. The patient is nervous/anxious.     All other systems reviewed and are negative. Current Outpatient Medications on File Prior to Visit   Medication Sig Dispense Refill    vitamin D (ERGOCALCIFEROL) 1.25 MG (58642 UT) CAPS capsule Take 1 capsule by mouth once a week TAKE ONE CAPSULE BY MOUTH ONCE A WEEK 12 capsule 1    lisinopril (PRINIVIL;ZESTRIL) 10 MG tablet Take 1 tablet by mouth daily 90 tablet 1    mupirocin (BACTROBAN) 2 % ointment Apply topically 3 times daily. 30 g 0    baclofen (LIORESAL) 10 MG tablet Take 10 mg by mouth 3 times daily as needed      cloNIDine (CATAPRES) 0.3 MG tablet TAKE ONE TABLET BY MOUTH AT BEDTIME      clobetasol (TEMOVATE) 0.05 % cream Apply topically 2 times daily. 60 g 1    ziprasidone (GEODON) 60 MG capsule TAKE TWO CAPSULES BY MOUTH TWO TIMES A DAY      triamcinolone (KENALOG) 0.1 % cream Apply topically 2 times daily Apply topically 2 times daily. lamoTRIgine (LAMICTAL) 200 MG tablet Take 200 mg by mouth daily Take 1 and a half tablets by mouth at bedtime and one half tablet at noon. dexmethylphenidate (FOCALIN) 10 MG tablet Take 10 mg by mouth 3 times daily. Take 2 tablets by mouth in the morning, 2 tablets at noon, and 1 tablet at 3pm      Multiple Vitamin (MULTI-VITAMIN DAILY PO) Take by mouth       No current facility-administered medications on file prior to visit. No Known Allergies    Past Medical History:   Diagnosis Date    Autism     Bipolar 1 disorder ([facility] [address]     COVID 2020    Essential hypertension 5/4/2021    Rectal bleed 2022    Seizures [facility])      Past Surgical History:   Procedure Laterality Date    COLONOSCOPY N/A 5/13/2022    COLONOSCOPY DIAGNOSTIC performed by Meenu Gaines MD at [place] [address]     History reviewed. No pertinent family history.   Social History     Socioeconomic History    Marital status: Single     Spouse name: Not on file    Number of children: Not on file    Years of education: Not on file    Highest education level: Not on file   Occupational History    Not on file   Tobacco Use    Smoking status: Never    Smokeless tobacco: Never   Substance and Sexual Activity    Alcohol use: Yes     Comment: rarely    Drug use: Not Currently    Sexual activity: Not Currently   Other Topics Concern    Not on file   Social History Narrative    Not on file     Social Determinants of Health     Financial Resource Strain: Not on file   Food Insecurity: Not on file   Transportation Needs: Not on file   Physical Activity: Not on file   Stress: Not on file   Social Connections: Not on file   Intimate Partner Violence: Not on file   Housing Stability: Not on file       Vitals:    09/21/22 1141   BP: 138/86   Pulse: 76   Temp: 97.8 °F (36.6 °C)   SpO2: 96%   Weight: (!) 311 lb (141.1 kg)   Height: 6' 6.5\" (1.994 m)         Physical Exam:  Physical Exam  Vitals and nursing note reviewed. Constitutional:       General: He is not in acute distress. Appearance: Normal appearance. He is well-developed and normal weight. He is not ill-appearing. HENT:      Head: Normocephalic and atraumatic. Right Ear: Hearing and external ear normal.      Left Ear: Hearing and external ear normal.      Nose:      Comments: Wearing mask  Eyes:      General: Lids are normal. No scleral icterus. Extraocular Movements: Extraocular movements intact. Conjunctiva/sclera: Conjunctivae normal.   Neck:      Thyroid: No thyromegaly. Cardiovascular:      Rate and Rhythm: Normal rate and regular rhythm. Heart sounds: Normal heart sounds. No murmur heard. Pulmonary:      Effort: Pulmonary effort is normal. No respiratory distress. Breath sounds: Normal breath sounds. No wheezing. Musculoskeletal:         General: No tenderness or deformity. Normal range of motion. Cervical back: Normal range of motion and neck supple. Right lower leg: No edema. Left lower leg: No edema. Lymphadenopathy:      Cervical: No cervical adenopathy. Skin:     General: Skin is warm and dry. Findings: No rash. Neurological:      General: No focal deficit present. Mental Status: He is alert and oriented to person, place, and time. Gait: Gait normal.   Psychiatric:         Mood and Affect: Affect normal. Mood is anxious. Speech: Speech is rapid and pressured and tangential.         Behavior: Behavior is hyperactive. Thought Content:  Thought content normal.       Labs:  CBC with Differential:    Lab Results   Component Value Date/Time    WBC 7.9 05/31/2022 01:33 PM    RBC 5.07 05/31/2022 01:33 PM    HGB 14.4 05/31/2022 01:33 PM    HCT 44.4 05/31/2022 01:33 PM     05/31/2022 01:33 PM    MCV 87.6 05/31/2022 01:33 PM    MCH 28.4 05/31/2022 01:33 PM    MCHC 32.4 05/31/2022 01:33 PM    RDW 13.4 05/31/2022 01:33 PM    LYMPHOPCT 28.7 05/31/2022 01:33 PM    MONOPCT 8.4 05/31/2022 01:33 PM    BASOPCT 0.6 05/31/2022 01:33 PM    MONOSABS 0.66 05/31/2022 01:33 PM    LYMPHSABS 2.27 05/31/2022 01:33 PM    EOSABS 0.21 05/31/2022 01:33 PM    BASOSABS 0.05 05/31/2022 01:33 PM     CMP:    Lab Results   Component Value Date/Time     05/31/2022 01:33 PM    K 4.8 05/31/2022 01:33 PM     05/31/2022 01:33 PM    CO2 25 05/31/2022 01:33 PM    BUN 13 05/31/2022 01:33 PM    CREATININE 0.7 05/31/2022 01:33 PM    GFRAA >60 05/31/2022 01:33 PM    LABGLOM >60 05/31/2022 01:33 PM    GLUCOSE 93 05/31/2022 01:33 PM    PROT 7.9 05/31/2022 01:33 PM    LABALBU 4.5 05/31/2022 01:33 PM    CALCIUM 9.1 05/31/2022 01:33 PM    BILITOT <0.2 05/31/2022 01:33 PM    ALKPHOS 88 05/31/2022 01:33 PM    AST 22 05/31/2022 01:33 PM    ALT 21 05/31/2022 01:33 PM     U/A:    Lab Results   Component Value Date/Time    NITRITE Neg 05/31/2022 01:16 PM    COLORU yellow 05/31/2022 01:16 PM    COLORU Yellow 09/14/2021 11:41 AM    PROTEINU Neg 05/31/2022 01:16 PM    PROTEINU Negative 09/14/2021 11:41 AM    PHUR 5.5 05/31/2022 01:16 PM    PHUR 5.5 09/14/2021 11:41 AM    WBCUA 0-1 09/14/2021 11:41 AM    RBCUA 0-1 09/14/2021 11:41 AM    BACTERIA MODERATE 09/14/2021 11:41 AM    CLARITYU Clear 05/31/2022 01:16 PM    CLARITYU CLOUDY 09/14/2021 11:41 AM    SPECGRAV >=1.030 05/31/2022 01:16 PM    SPECGRAV >=1.030 09/14/2021 11:41 AM    LEUKOCYTESUR Neg 05/31/2022 01:16 PM    LEUKOCYTESUR Negative 09/14/2021 11:41 AM    UROBILINOGEN 0.2 09/14/2021 11:41 AM    BILIRUBINUR Neg 05/31/2022 01:16 PM    BLOODU Negative 05/31/2022 01:16 PM    BLOODU Negative 09/14/2021 11:41 AM    GLUCOSEU Neg 05/31/2022 01:16 PM    GLUCOSEU Negative 09/14/2021 11:41 AM    AMORPHOUS PRESENT 09/14/2021 11:41 AM     HgBA1c:    Lab Results   Component Value Date/Time    LABA1C 5.3 09/14/2021 11:42 AM     FLP:    Lab Results   Component Value Date/Time    TRIG 63 09/14/2021 11:42 AM    HDL 66 09/14/2021 11:42 AM    LDLCALC 134 09/14/2021 11:42 AM    LABVLDL 13 09/14/2021 11:42 AM     TSH:    Lab Results   Component Value Date/Time    TSH 4.200 09/14/2021 11:42 AM        Assessment and Plan:  Livia Cuellar was seen today for referral - general and immunizations. Diagnoses and all orders for this visit:    Essential hypertension  Borderline control today. Will continue to monitor. Due for labs at this time. Already ordered. Bipolar disease, chronic [facility])  Referral placed to Dr. Shelli Thompson in August.  Patient must call their office to schedule. [place] number given. Pervasive developmental disorder    Autism    Vitamin D deficiency  Due for recheck    Need for influenza vaccination  -     Influenza, FLUCELVAX, (age 10 mo+), IM, Preservative Free, 0.5 mL        Return in about 6 months (around 3/21/2023), or if symptoms worsen or fail to improve, for Well visit.       Seen By:  Alex Steele,

## 2022-11-07 ENCOUNTER — OFFICE VISIT (OUTPATIENT)
Dept: FAMILY MEDICINE CLINIC | Age: 27
End: 2022-11-07
Payer: MEDICARE

## 2022-11-07 VITALS
OXYGEN SATURATION: 97 % | DIASTOLIC BLOOD PRESSURE: 82 MMHG | SYSTOLIC BLOOD PRESSURE: 124 MMHG | HEART RATE: 102 BPM | BODY MASS INDEX: 35.48 KG/M2 | WEIGHT: 311 LBS | TEMPERATURE: 97.7 F

## 2022-11-07 DIAGNOSIS — M54.6 DORSALGIA OF THORACOLUMBAR REGION: Primary | ICD-10-CM

## 2022-11-07 DIAGNOSIS — L98.9 SKIN SORE: ICD-10-CM

## 2022-11-07 DIAGNOSIS — M54.50 DORSALGIA OF THORACOLUMBAR REGION: Primary | ICD-10-CM

## 2022-11-07 PROCEDURE — 99214 OFFICE O/P EST MOD 30 MIN: CPT | Performed by: PHYSICIAN ASSISTANT

## 2022-11-07 PROCEDURE — 3074F SYST BP LT 130 MM HG: CPT | Performed by: PHYSICIAN ASSISTANT

## 2022-11-07 PROCEDURE — 3078F DIAST BP <80 MM HG: CPT | Performed by: PHYSICIAN ASSISTANT

## 2022-11-07 RX ORDER — DOXYCYCLINE HYCLATE 100 MG
100 TABLET ORAL 2 TIMES DAILY
Qty: 20 TABLET | Refills: 0 | Status: SHIPPED | OUTPATIENT
Start: 2022-11-07 | End: 2022-11-17

## 2022-11-07 RX ORDER — BACLOFEN 10 MG/1
10 TABLET ORAL 3 TIMES DAILY PRN
Qty: 30 TABLET | Refills: 0 | Status: SHIPPED | OUTPATIENT
Start: 2022-11-07

## 2022-11-07 NOTE — PROGRESS NOTES
22  Jacqueline Haynes : 1995 Sex: male  Age 32 y.o. Subjective:  Chief Complaint   Patient presents with    Back Pain     Upper back into tailbone     Wound Infection     Has wound on stomach and back, concerned for infection         HPI:   Jacqueline Haynes , 32 y.o. male presents to express care for evaluation of back pain, wounds on his abdomen     HPI  60-year-old male presents to express care for evaluation of back pain from the mid thoracic area to the lumbar spine. The patient denies any falls or injury. But the patient was moving a lot of branches and helping his parents out on their property on Saturday. No significant pain at the time but at that evening the patient started having increased amount of pain discomfort to the thoracic and lumbar spine. The patient has not any bladder or bowel incontinence, urinary tension. The patient was given a prescription for baclofen in the past but it is . The patient also is here because he has been picking at these sores on his abdomen and his back. The patient is not having any significant redness. He believes that the 1 on his stomach is infected. The patient denies any other injuries or complaints at this time. ROS:   Unless otherwise stated in this report the patient's positive and negative responses for review of systems for constitutional, eyes, ENT, cardiovascular, respiratory, gastrointestinal, neurological, , musculoskeletal, and integument systems and related systems to the presenting problem are either stated in the history of present illness or were not pertinent or were negative for the symptoms and/or complaints related to the presenting medical problem. Positives and pertinent negatives as per HPI. All others reviewed and are negative.       PMH:     Past Medical History:   Diagnosis Date    Autism     Bipolar 1 disorder ([facility] [address]     COVID 2020    Essential hypertension 2021    Rectal bleed  Seizures [facility])        Past Surgical History:   Procedure Laterality Date    COLONOSCOPY N/A 5/13/2022    COLONOSCOPY DIAGNOSTIC performed by Salazar Love MD at [place] [address]       History reviewed. No pertinent family history. Medications:     Current Outpatient Medications:     baclofen (LIORESAL) 10 MG tablet, Take 1 tablet by mouth 3 times daily as needed (back pain), Disp: 30 tablet, Rfl: 0    doxycycline hyclate (VIBRA-TABS) 100 MG tablet, Take 1 tablet by mouth 2 times daily for 10 days, Disp: 20 tablet, Rfl: 0    mupirocin (BACTROBAN) 2 % ointment, Apply topically 3 times daily. , Disp: 30 g, Rfl: 0    vitamin D (ERGOCALCIFEROL) 1.25 MG (07608 UT) CAPS capsule, Take 1 capsule by mouth once a week TAKE ONE CAPSULE BY MOUTH ONCE A WEEK, Disp: 12 capsule, Rfl: 1    lisinopril (PRINIVIL;ZESTRIL) 10 MG tablet, Take 1 tablet by mouth daily, Disp: 90 tablet, Rfl: 1    cloNIDine (CATAPRES) 0.3 MG tablet, TAKE ONE TABLET BY MOUTH AT BEDTIME, Disp: , Rfl:     clobetasol (TEMOVATE) 0.05 % cream, Apply topically 2 times daily. , Disp: 60 g, Rfl: 1    ziprasidone (GEODON) 60 MG capsule, TAKE TWO CAPSULES BY MOUTH TWO TIMES A DAY, Disp: , Rfl:     triamcinolone (KENALOG) 0.1 % cream, Apply topically 2 times daily Apply topically 2 times daily. , Disp: , Rfl:     lamoTRIgine (LAMICTAL) 200 MG tablet, Take 200 mg by mouth daily Take 1 and a half tablets by mouth at bedtime and one half tablet at noon., Disp: , Rfl:     dexmethylphenidate (FOCALIN) 10 MG tablet, Take 10 mg by mouth 3 times daily.  Take 2 tablets by mouth in the morning, 2 tablets at noon, and 1 tablet at 3pm, Disp: , Rfl:     Multiple Vitamin (MULTI-VITAMIN DAILY PO), Take by mouth, Disp: , Rfl:     Allergies:   No Known Allergies    Social History:     Social History     Tobacco Use    Smoking status: Never    Smokeless tobacco: Never   Substance Use Topics    Alcohol use: Yes     Comment: rarely    Drug use: Not Currently Patient lives at home. Physical Exam:     Vitals:    11/07/22 1550   BP: 124/82   Site: Right Upper Arm   Position: Sitting   Pulse: (!) 102   Temp: 97.7 °F (36.5 °C)   TempSrc: Temporal   SpO2: 97%   Weight: (!) 311 lb (141.1 kg)       Exam:  Physical Exam  Nurses note and vital signs reviewed and patient is not hypoxic. General: The patient appears well and in no apparent distress. Patient is resting comfortably on cart. Skin: Warm, dry, no pallor noted. There is no rash noted. Head: Normocephalic, atraumatic. Eye: Normal conjunctiva  Ears, Nose, Mouth, and Throat: Oral mucosa is moist  Cardiovascular: Regular Rate and Rhythm  Respiratory: Patient is in no distress, no accessory muscle use, lungs are clear to auscultation, no wheezing, crackles or rhonchi  Back: Non-tender, no CVA tenderness bilaterally to percussion. GI: Normal bowel sounds, no tenderness to palpation, no masses appreciated. No rebound, guarding, or rigidity noted. Musculoskeletal: The patient has no evidence of calf tenderness, no pitting edema, symmetrical pulses noted bilaterally  Neurological: A&O x4, normal speech      Testing:     XR THORACIC SPINE (3 VIEWS)    Result Date: 11/7/2022  EXAMINATION: 3 XRAY VIEWS OF THE LUMBAR SPINE; THREE XRAY VIEWS OF THE THORACIC SPINE 11/7/2022 4:31 pm COMPARISON: None. HISTORY: ORDERING SYSTEM PROVIDED HISTORY: Dorsalgia of thoracolumbar region TECHNOLOGIST PROVIDED HISTORY: Reason for exam:->low back pain FINDINGS: AP, lateral, and coned-down lateral views of the lumbar spine were obtained as well as AP, lateral, and swimmer's views of the the thoracic spine. The thoracic spine examination consists of 5 images. Thoracic spine: Vertebral alignment is anatomic. There are no compression deformities. The intervertebral disc space heights appear preserved. The pedicles are intact at all levels. The paravertebral soft tissue structures are unremarkable.  Lumbar spine: Vertebral alignment is anatomic. There are no compression deformities. The intervertebral disc space heights are preserved throughout the lumbar spine. The pedicles are intact at all levels. The paravertebral soft tissue structures are unremarkable. The bilateral sacroiliac joints are patent and symmetric. Unremarkable plain film examination of the thoracic and lumbar spine. XR LUMBAR SPINE (2-3 VIEWS)    Result Date: 11/7/2022  EXAMINATION: 3 XRAY VIEWS OF THE LUMBAR SPINE; THREE XRAY VIEWS OF THE THORACIC SPINE 11/7/2022 4:31 pm COMPARISON: None. HISTORY: ORDERING SYSTEM PROVIDED HISTORY: Dorsalgia of thoracolumbar region TECHNOLOGIST PROVIDED HISTORY: Reason for exam:->low back pain FINDINGS: AP, lateral, and coned-down lateral views of the lumbar spine were obtained as well as AP, lateral, and swimmer's views of the the thoracic spine. The thoracic spine examination consists of 5 images. Thoracic spine: Vertebral alignment is anatomic. There are no compression deformities. The intervertebral disc space heights appear preserved. The pedicles are intact at all levels. The paravertebral soft tissue structures are unremarkable. Lumbar spine: Vertebral alignment is anatomic. There are no compression deformities. The intervertebral disc space heights are preserved throughout the lumbar spine. The pedicles are intact at all levels. The paravertebral soft tissue structures are unremarkable. The bilateral sacroiliac joints are patent and symmetric. Unremarkable plain film examination of the thoracic and lumbar spine. Medical Decision Making:     Vital signs reviewed    Past medical history reviewed. Allergies reviewed. Medications reviewed. Patient on arrival does not appear to be in any apparent distress or discomfort. The patient has been seen and evaluated. The patient does not appear to be toxic or lethargic. The patient was sent for x-rays of the thoracic and lumbar spine.     The patient was given a refill of the baclofen. The patient had the wounds cleaned, irrigated and topical antibiotic ointment applied to the source of the abdomen into the left shoulder area. X-rays did not reveal any evidence of acute abnormality. The patient had the wounds cleaned, irrigated, topical antibiotic ointment applied and dressing. The patient will be started on doxycycline, mupirocin. The patient was also given baclofen. The patient is to return to express care or go directly to the emergency department should any of the signs or symptoms worsen. The patient is to followup with primary care physician in 2-3 days for repeat evaluation. The patient has no other questions or concerns at this time the patient will be discharged home. Clinical Impression:   Kenyatta Wells was seen today for back pain and wound infection. Diagnoses and all orders for this visit:    Dorsalgia of thoracolumbar region  -     XR THORACIC SPINE (3 VIEWS); Future  -     XR LUMBAR SPINE (2-3 VIEWS); Future    Skin sore    Other orders  -     baclofen (LIORESAL) 10 MG tablet; Take 1 tablet by mouth 3 times daily as needed (back pain)  -     doxycycline hyclate (VIBRA-TABS) 100 MG tablet; Take 1 tablet by mouth 2 times daily for 10 days  -     mupirocin (BACTROBAN) 2 % ointment; Apply topically 3 times daily. The patient is to call for any concerns or return if any of the signs or symptoms worsen. The patient is to follow-up with PCP in the next 2-3 days for repeat evaluation repeat assessment or go directly to the emergency department.      SIGNATURE: Amador Tovar III, PA-C

## 2022-12-05 ENCOUNTER — TELEPHONE (OUTPATIENT)
Dept: PRIMARY CARE CLINIC | Age: 27
End: 2022-12-05

## 2022-12-05 RX ORDER — BACLOFEN 10 MG/1
10 TABLET ORAL NIGHTLY PRN
Qty: 90 TABLET | Refills: 1 | Status: SHIPPED | OUTPATIENT
Start: 2022-12-05

## 2022-12-05 NOTE — TELEPHONE ENCOUNTER
The pt is calling to see if he can get a refill on the baclofen 10 mg, he is asking if he can get 90 days worth, I did not pend it because I wasn't sure if you were okay with this

## 2023-03-01 DIAGNOSIS — I10 ESSENTIAL HYPERTENSION: ICD-10-CM

## 2023-03-01 RX ORDER — ERGOCALCIFEROL 1.25 MG/1
50000 CAPSULE ORAL WEEKLY
Qty: 12 CAPSULE | Refills: 0 | Status: SHIPPED | OUTPATIENT
Start: 2023-03-01

## 2023-03-01 RX ORDER — LISINOPRIL 10 MG/1
10 TABLET ORAL DAILY
Qty: 90 TABLET | Refills: 0 | Status: SHIPPED | OUTPATIENT
Start: 2023-03-01

## 2023-03-01 RX ORDER — BACLOFEN 10 MG/1
10 TABLET ORAL NIGHTLY PRN
Qty: 90 TABLET | Refills: 0 | Status: SHIPPED | OUTPATIENT
Start: 2023-03-01

## 2023-03-04 DIAGNOSIS — I10 ESSENTIAL HYPERTENSION: ICD-10-CM

## 2023-03-06 RX ORDER — LISINOPRIL 10 MG/1
TABLET ORAL
Qty: 90 TABLET | Refills: 0 | OUTPATIENT
Start: 2023-03-06

## 2023-03-07 RX ORDER — ERGOCALCIFEROL 1.25 MG/1
CAPSULE ORAL
Qty: 12 CAPSULE | Refills: 1 | Status: SHIPPED | OUTPATIENT
Start: 2023-03-07

## 2023-03-21 ENCOUNTER — OFFICE VISIT (OUTPATIENT)
Dept: PRIMARY CARE CLINIC | Age: 28
End: 2023-03-21
Payer: MEDICARE

## 2023-03-21 VITALS
TEMPERATURE: 98.5 F | BODY MASS INDEX: 36.45 KG/M2 | DIASTOLIC BLOOD PRESSURE: 82 MMHG | OXYGEN SATURATION: 98 % | HEART RATE: 103 BPM | WEIGHT: 315 LBS | SYSTOLIC BLOOD PRESSURE: 138 MMHG | HEIGHT: 78 IN

## 2023-03-21 DIAGNOSIS — R73.01 IMPAIRED FASTING GLUCOSE: ICD-10-CM

## 2023-03-21 DIAGNOSIS — E66.01 SEVERE OBESITY (BMI 35.0-39.9) WITH COMORBIDITY (HCC): ICD-10-CM

## 2023-03-21 DIAGNOSIS — L20.84 INTRINSIC ECZEMA: ICD-10-CM

## 2023-03-21 DIAGNOSIS — I10 ESSENTIAL HYPERTENSION: Primary | ICD-10-CM

## 2023-03-21 DIAGNOSIS — E55.9 VITAMIN D DEFICIENCY: ICD-10-CM

## 2023-03-21 DIAGNOSIS — F31.9 BIPOLAR DISEASE, CHRONIC (HCC): ICD-10-CM

## 2023-03-21 DIAGNOSIS — F84.9 PERVASIVE DEVELOPMENTAL DISORDER: ICD-10-CM

## 2023-03-21 DIAGNOSIS — F84.0 AUTISM: ICD-10-CM

## 2023-03-21 PROCEDURE — 1036F TOBACCO NON-USER: CPT | Performed by: FAMILY MEDICINE

## 2023-03-21 PROCEDURE — 3079F DIAST BP 80-89 MM HG: CPT | Performed by: FAMILY MEDICINE

## 2023-03-21 PROCEDURE — 99214 OFFICE O/P EST MOD 30 MIN: CPT | Performed by: FAMILY MEDICINE

## 2023-03-21 PROCEDURE — 3075F SYST BP GE 130 - 139MM HG: CPT | Performed by: FAMILY MEDICINE

## 2023-03-21 PROCEDURE — G8427 DOCREV CUR MEDS BY ELIG CLIN: HCPCS | Performed by: FAMILY MEDICINE

## 2023-03-21 PROCEDURE — G8482 FLU IMMUNIZE ORDER/ADMIN: HCPCS | Performed by: FAMILY MEDICINE

## 2023-03-21 PROCEDURE — G8417 CALC BMI ABV UP PARAM F/U: HCPCS | Performed by: FAMILY MEDICINE

## 2023-03-21 RX ORDER — CLOBETASOL PROPIONATE 0.5 MG/G
CREAM TOPICAL
Qty: 60 G | Refills: 1 | Status: SHIPPED | OUTPATIENT
Start: 2023-03-21

## 2023-03-21 RX ORDER — RISPERIDONE 25 MG/2 ML
KIT INTRAMUSCULAR
COMMUNITY
Start: 2023-03-16

## 2023-03-21 ASSESSMENT — PATIENT HEALTH QUESTIONNAIRE - PHQ9
5. POOR APPETITE OR OVEREATING: 1
SUM OF ALL RESPONSES TO PHQ9 QUESTIONS 1 & 2: 1
SUM OF ALL RESPONSES TO PHQ QUESTIONS 1-9: 10
10. IF YOU CHECKED OFF ANY PROBLEMS, HOW DIFFICULT HAVE THESE PROBLEMS MADE IT FOR YOU TO DO YOUR WORK, TAKE CARE OF THINGS AT HOME, OR GET ALONG WITH OTHER PEOPLE: 1
SUM OF ALL RESPONSES TO PHQ QUESTIONS 1-9: 10
3. TROUBLE FALLING OR STAYING ASLEEP: 0
9. THOUGHTS THAT YOU WOULD BE BETTER OFF DEAD, OR OF HURTING YOURSELF: 0
1. LITTLE INTEREST OR PLEASURE IN DOING THINGS: 0
7. TROUBLE CONCENTRATING ON THINGS, SUCH AS READING THE NEWSPAPER OR WATCHING TELEVISION: 3
8. MOVING OR SPEAKING SO SLOWLY THAT OTHER PEOPLE COULD HAVE NOTICED. OR THE OPPOSITE, BEING SO FIGETY OR RESTLESS THAT YOU HAVE BEEN MOVING AROUND A LOT MORE THAN USUAL: 1
6. FEELING BAD ABOUT YOURSELF - OR THAT YOU ARE A FAILURE OR HAVE LET YOURSELF OR YOUR FAMILY DOWN: 1
2. FEELING DOWN, DEPRESSED OR HOPELESS: 1
4. FEELING TIRED OR HAVING LITTLE ENERGY: 3
SUM OF ALL RESPONSES TO PHQ QUESTIONS 1-9: 10
SUM OF ALL RESPONSES TO PHQ QUESTIONS 1-9: 10

## 2023-03-21 ASSESSMENT — ENCOUNTER SYMPTOMS
WHEEZING: 0
CONSTIPATION: 0
BACK PAIN: 0
SHORTNESS OF BREATH: 0
DIARRHEA: 0
VOMITING: 0
COUGH: 0
ABDOMINAL PAIN: 0
NAUSEA: 0

## 2023-03-21 NOTE — PROGRESS NOTES
09/14/2021 11:41 AM    CLARITYU Clear 09/21/2022 12:42 PM    SPECGRAV >=1.030 09/21/2022 12:42 PM    LEUKOCYTESUR Negative 09/21/2022 12:42 PM    UROBILINOGEN 1.0 09/21/2022 12:42 PM    BILIRUBINUR Negative 09/21/2022 12:42 PM    BILIRUBINUR Neg 05/31/2022 01:16 PM    BLOODU Negative 09/21/2022 12:42 PM    GLUCOSEU Negative 09/21/2022 12:42 PM    AMORPHOUS PRESENT 09/14/2021 11:41 AM     HgBA1c:    Lab Results   Component Value Date/Time    LABA1C 5.4 09/21/2022 12:25 PM     FLP:    Lab Results   Component Value Date/Time    TRIG 53 09/21/2022 12:25 PM    HDL 55 09/21/2022 12:25 PM    LDLCALC 145 09/21/2022 12:25 PM    LABVLDL 11 09/21/2022 12:25 PM     TSH:    Lab Results   Component Value Date/Time    TSH 2.050 09/21/2022 12:25 PM        Assessment and Plan:  Michelle White was seen today for other. Diagnoses and all orders for this visit:    Essential hypertension  -     CBC with Auto Differential; Future  -     Comprehensive Metabolic Panel; Future  -     Lipid Panel; Future  -     TSH; Future  -     Urinalysis; Future  -     Vitamin B12 & Folate; Future  -     Uric Acid; Future  Well controlled. Due for labs at this time. Bipolar disease, chronic ([address]  Follows with Psych. Speech and tangential thinking improved today    Intrinsic eczema  -     clobetasol (TEMOVATE) 0.05 % cream; Apply topically 2 times daily. Autism    Pervasive developmental disorder    Severe obesity (BMI 35.0-39. 9) with comorbidity (HCC)    Impaired fasting glucose  -     Hemoglobin A1C; Future    Vitamin D deficiency  -     Vitamin D 25 Hydroxy; Future          Return in about 6 months (around 9/21/2023), or if symptoms worsen or fail to improve, for Chronic medical conditions.       Seen By:  Jazlyn Mcfarlane,

## 2023-09-01 RX ORDER — ERGOCALCIFEROL 1.25 MG/1
50000 CAPSULE ORAL WEEKLY
Qty: 12 CAPSULE | Refills: 1 | Status: SHIPPED | OUTPATIENT
Start: 2023-09-01

## 2023-09-25 ENCOUNTER — OFFICE VISIT (OUTPATIENT)
Dept: PRIMARY CARE CLINIC | Age: 28
End: 2023-09-25
Payer: MEDICARE

## 2023-09-25 VITALS
DIASTOLIC BLOOD PRESSURE: 86 MMHG | WEIGHT: 315 LBS | TEMPERATURE: 97.9 F | HEIGHT: 78 IN | SYSTOLIC BLOOD PRESSURE: 138 MMHG | BODY MASS INDEX: 36.45 KG/M2 | OXYGEN SATURATION: 97 % | HEART RATE: 76 BPM

## 2023-09-25 DIAGNOSIS — F84.0 AUTISM: ICD-10-CM

## 2023-09-25 DIAGNOSIS — E55.9 VITAMIN D DEFICIENCY: ICD-10-CM

## 2023-09-25 DIAGNOSIS — R73.01 IMPAIRED FASTING GLUCOSE: ICD-10-CM

## 2023-09-25 DIAGNOSIS — I10 ESSENTIAL HYPERTENSION: Primary | ICD-10-CM

## 2023-09-25 DIAGNOSIS — I10 ESSENTIAL HYPERTENSION: ICD-10-CM

## 2023-09-25 DIAGNOSIS — F31.9 BIPOLAR DISEASE, CHRONIC (HCC): ICD-10-CM

## 2023-09-25 DIAGNOSIS — F84.9 PERVASIVE DEVELOPMENTAL DISORDER: ICD-10-CM

## 2023-09-25 LAB
ABSOLUTE IMMATURE GRANULOCYTE: <0.03 K/UL (ref 0–0.58)
ALBUMIN SERPL-MCNC: 4.5 G/DL (ref 3.5–5.2)
ALP BLD-CCNC: 97 U/L (ref 40–129)
ALT SERPL-CCNC: 21 U/L (ref 0–40)
ANION GAP SERPL CALCULATED.3IONS-SCNC: 14 MMOL/L (ref 7–16)
AST SERPL-CCNC: 27 U/L (ref 0–39)
BASOPHILS ABSOLUTE: 0.05 K/UL (ref 0–0.2)
BASOPHILS RELATIVE PERCENT: 1 % (ref 0–2)
BILIRUB SERPL-MCNC: 0.4 MG/DL (ref 0–1.2)
BILIRUBIN URINE: NEGATIVE
BUN BLDV-MCNC: 17 MG/DL (ref 6–20)
CALCIUM SERPL-MCNC: 9.7 MG/DL (ref 8.6–10.2)
CHLORIDE BLD-SCNC: 105 MMOL/L (ref 98–107)
CHOLESTEROL: 198 MG/DL
CO2: 22 MMOL/L (ref 22–29)
COLOR: YELLOW
CREAT SERPL-MCNC: 0.9 MG/DL (ref 0.7–1.2)
EOSINOPHILS ABSOLUTE: 0.17 K/UL (ref 0.05–0.5)
EOSINOPHILS RELATIVE PERCENT: 3 % (ref 0–6)
FOLATE: 10.9 NG/ML (ref 4.8–24.2)
GFR SERPL CREATININE-BSD FRML MDRD: >60 ML/MIN/1.73M2
GLUCOSE BLD-MCNC: 88 MG/DL (ref 74–99)
GLUCOSE URINE: NEGATIVE MG/DL
HBA1C MFR BLD: 5.3 % (ref 4–5.6)
HCT VFR BLD CALC: 44.9 % (ref 37–54)
HDLC SERPL-MCNC: 56 MG/DL
HEMOGLOBIN: 14.7 G/DL (ref 12.5–16.5)
IMMATURE GRANULOCYTES: 0 % (ref 0–5)
KETONES, URINE: NEGATIVE MG/DL
LDL CHOLESTEROL: 131 MG/DL
LEUKOCYTE ESTERASE, URINE: ABNORMAL
LYMPHOCYTES ABSOLUTE: 2.55 K/UL (ref 1.5–4)
LYMPHOCYTES RELATIVE PERCENT: 39 % (ref 20–42)
MCH RBC QN AUTO: 28.7 PG (ref 26–35)
MCHC RBC AUTO-ENTMCNC: 32.7 G/DL (ref 32–34.5)
MCV RBC AUTO: 87.5 FL (ref 80–99.9)
MONOCYTES ABSOLUTE: 0.52 K/UL (ref 0.1–0.95)
MONOCYTES RELATIVE PERCENT: 8 % (ref 2–12)
MUCUS: PRESENT
NEUTROPHILS ABSOLUTE: 3.3 K/UL (ref 1.8–7.3)
NEUTROPHILS RELATIVE PERCENT: 50 % (ref 43–80)
NITRITE, URINE: NEGATIVE
PDW BLD-RTO: 13.3 % (ref 11.5–15)
PH UA: 6 (ref 5–9)
PLATELET # BLD: 269 K/UL (ref 130–450)
PMV BLD AUTO: 9.9 FL (ref 7–12)
POTASSIUM SERPL-SCNC: 4.4 MMOL/L (ref 3.5–5)
PROTEIN UA: NEGATIVE MG/DL
RBC # BLD: 5.13 M/UL (ref 3.8–5.8)
RBC UA: NORMAL /HPF
SODIUM BLD-SCNC: 141 MMOL/L (ref 132–146)
SPECIFIC GRAVITY UA: >1.03 (ref 1–1.03)
TOTAL PROTEIN: 8 G/DL (ref 6.4–8.3)
TRIGL SERPL-MCNC: 57 MG/DL
TSH SERPL DL<=0.05 MIU/L-ACNC: 2.58 UIU/ML (ref 0.27–4.2)
TURBIDITY: CLEAR
URIC ACID: 6.8 MG/DL (ref 3.4–7)
URINE HGB: NEGATIVE
UROBILINOGEN, URINE: 0.2 EU/DL (ref 0–1)
VITAMIN B-12: 862 PG/ML (ref 211–946)
VITAMIN D 25-HYDROXY: 46.2 NG/ML (ref 30–100)
VLDLC SERPL CALC-MCNC: 11 MG/DL
WBC # BLD: 6.6 K/UL (ref 4.5–11.5)
WBC UA: NORMAL /HPF

## 2023-09-25 PROCEDURE — 1036F TOBACCO NON-USER: CPT | Performed by: FAMILY MEDICINE

## 2023-09-25 PROCEDURE — 99214 OFFICE O/P EST MOD 30 MIN: CPT | Performed by: FAMILY MEDICINE

## 2023-09-25 PROCEDURE — 3079F DIAST BP 80-89 MM HG: CPT | Performed by: FAMILY MEDICINE

## 2023-09-25 PROCEDURE — 3075F SYST BP GE 130 - 139MM HG: CPT | Performed by: FAMILY MEDICINE

## 2023-09-25 PROCEDURE — G8417 CALC BMI ABV UP PARAM F/U: HCPCS | Performed by: FAMILY MEDICINE

## 2023-09-25 PROCEDURE — G8427 DOCREV CUR MEDS BY ELIG CLIN: HCPCS | Performed by: FAMILY MEDICINE

## 2023-09-25 RX ORDER — OXCARBAZEPINE 150 MG/1
150 TABLET, FILM COATED ORAL 2 TIMES DAILY
COMMUNITY
Start: 2023-08-29

## 2023-09-25 SDOH — ECONOMIC STABILITY: HOUSING INSECURITY
IN THE LAST 12 MONTHS, WAS THERE A TIME WHEN YOU DID NOT HAVE A STEADY PLACE TO SLEEP OR SLEPT IN A SHELTER (INCLUDING NOW)?: NO

## 2023-09-25 SDOH — ECONOMIC STABILITY: INCOME INSECURITY: HOW HARD IS IT FOR YOU TO PAY FOR THE VERY BASICS LIKE FOOD, HOUSING, MEDICAL CARE, AND HEATING?: NOT HARD AT ALL

## 2023-09-25 SDOH — ECONOMIC STABILITY: FOOD INSECURITY: WITHIN THE PAST 12 MONTHS, YOU WORRIED THAT YOUR FOOD WOULD RUN OUT BEFORE YOU GOT MONEY TO BUY MORE.: NEVER TRUE

## 2023-09-25 SDOH — ECONOMIC STABILITY: FOOD INSECURITY: WITHIN THE PAST 12 MONTHS, THE FOOD YOU BOUGHT JUST DIDN'T LAST AND YOU DIDN'T HAVE MONEY TO GET MORE.: NEVER TRUE

## 2023-09-25 ASSESSMENT — ENCOUNTER SYMPTOMS
BACK PAIN: 0
VOMITING: 0
NAUSEA: 0
ABDOMINAL PAIN: 0
COUGH: 0
CONSTIPATION: 0
WHEEZING: 0
DIARRHEA: 0
SHORTNESS OF BREATH: 0

## 2023-11-22 RX ORDER — ERGOCALCIFEROL 1.25 MG/1
50000 CAPSULE ORAL WEEKLY
Qty: 12 CAPSULE | Refills: 3 | Status: SHIPPED
Start: 2023-11-22 | End: 2023-11-30 | Stop reason: SDUPTHER

## 2023-11-29 DIAGNOSIS — I10 ESSENTIAL HYPERTENSION: ICD-10-CM

## 2023-11-29 RX ORDER — LISINOPRIL 10 MG/1
10 TABLET ORAL DAILY
Qty: 90 TABLET | Refills: 0 | Status: SHIPPED
Start: 2023-11-29 | End: 2023-11-30 | Stop reason: SDUPTHER

## 2023-11-30 DIAGNOSIS — I10 ESSENTIAL HYPERTENSION: ICD-10-CM

## 2023-11-30 RX ORDER — ERGOCALCIFEROL 1.25 MG/1
50000 CAPSULE ORAL WEEKLY
Qty: 12 CAPSULE | Refills: 3 | Status: SHIPPED | OUTPATIENT
Start: 2023-11-30

## 2023-11-30 RX ORDER — LISINOPRIL 10 MG/1
10 TABLET ORAL DAILY
Qty: 90 TABLET | Refills: 0 | Status: SHIPPED | OUTPATIENT
Start: 2023-11-30

## 2023-12-05 ENCOUNTER — OFFICE VISIT (OUTPATIENT)
Dept: PRIMARY CARE CLINIC | Age: 28
End: 2023-12-05
Payer: MEDICARE

## 2023-12-05 VITALS
WEIGHT: 315 LBS | DIASTOLIC BLOOD PRESSURE: 78 MMHG | HEIGHT: 78 IN | SYSTOLIC BLOOD PRESSURE: 120 MMHG | OXYGEN SATURATION: 99 % | HEART RATE: 102 BPM | TEMPERATURE: 98 F | BODY MASS INDEX: 36.45 KG/M2

## 2023-12-05 DIAGNOSIS — Z11.59 NEED FOR HEPATITIS C SCREENING TEST: ICD-10-CM

## 2023-12-05 DIAGNOSIS — J02.9 SORE THROAT: Primary | ICD-10-CM

## 2023-12-05 DIAGNOSIS — Z20.2 EXPOSURE TO STD: ICD-10-CM

## 2023-12-05 DIAGNOSIS — L73.9 FOLLICULITIS: ICD-10-CM

## 2023-12-05 DIAGNOSIS — Z11.4 SCREENING FOR HIV (HUMAN IMMUNODEFICIENCY VIRUS): ICD-10-CM

## 2023-12-05 DIAGNOSIS — J02.0 STREP PHARYNGITIS: ICD-10-CM

## 2023-12-05 DIAGNOSIS — G47.10 HYPERSOMNOLENCE: ICD-10-CM

## 2023-12-05 LAB
ABSOLUTE IMMATURE GRANULOCYTE: 0.03 K/UL (ref 0–0.58)
ALBUMIN SERPL-MCNC: 4.6 G/DL (ref 3.5–5.2)
ALP BLD-CCNC: 109 U/L (ref 40–129)
ALT SERPL-CCNC: 22 U/L (ref 0–40)
ANION GAP SERPL CALCULATED.3IONS-SCNC: 15 MMOL/L (ref 7–16)
AST SERPL-CCNC: 22 U/L (ref 0–39)
BASOPHILS ABSOLUTE: 0.06 K/UL (ref 0–0.2)
BASOPHILS RELATIVE PERCENT: 1 % (ref 0–2)
BILIRUB SERPL-MCNC: 0.6 MG/DL (ref 0–1.2)
BUN BLDV-MCNC: 14 MG/DL (ref 6–20)
CALCIUM SERPL-MCNC: 9.7 MG/DL (ref 8.6–10.2)
CHLORIDE BLD-SCNC: 98 MMOL/L (ref 98–107)
CO2: 23 MMOL/L (ref 22–29)
CREAT SERPL-MCNC: 0.8 MG/DL (ref 0.7–1.2)
EOSINOPHILS ABSOLUTE: 0.16 K/UL (ref 0.05–0.5)
EOSINOPHILS RELATIVE PERCENT: 1 % (ref 0–6)
GFR SERPL CREATININE-BSD FRML MDRD: >60 ML/MIN/1.73M2
GLUCOSE BLD-MCNC: 77 MG/DL (ref 74–99)
HCT VFR BLD CALC: 45.2 % (ref 37–54)
HEMOGLOBIN: 14.7 G/DL (ref 12.5–16.5)
IMMATURE GRANULOCYTES: 0 % (ref 0–5)
INFLUENZA A ANTIBODY: NEGATIVE
INFLUENZA B ANTIBODY: NEGATIVE
LYMPHOCYTES ABSOLUTE: 1.74 K/UL (ref 1.5–4)
LYMPHOCYTES RELATIVE PERCENT: 15 % (ref 20–42)
Lab: NORMAL
MCH RBC QN AUTO: 28.4 PG (ref 26–35)
MCHC RBC AUTO-ENTMCNC: 32.5 G/DL (ref 32–34.5)
MCV RBC AUTO: 87.4 FL (ref 80–99.9)
MONOCYTES ABSOLUTE: 1.01 K/UL (ref 0.1–0.95)
MONOCYTES RELATIVE PERCENT: 9 % (ref 2–12)
NEUTROPHILS ABSOLUTE: 8.5 K/UL (ref 1.8–7.3)
NEUTROPHILS RELATIVE PERCENT: 74 % (ref 43–80)
PDW BLD-RTO: 13.7 % (ref 11.5–15)
PERFORMING INSTRUMENT: NORMAL
PLATELET # BLD: 288 K/UL (ref 130–450)
PMV BLD AUTO: 9.7 FL (ref 7–12)
POTASSIUM SERPL-SCNC: 4.4 MMOL/L (ref 3.5–5)
QC PASS/FAIL: NORMAL
RBC # BLD: 5.17 M/UL (ref 3.8–5.8)
S PYO AG THROAT QL: POSITIVE
SARS-COV-2, POC: NORMAL
SODIUM BLD-SCNC: 136 MMOL/L (ref 132–146)
TOTAL PROTEIN: 8.1 G/DL (ref 6.4–8.3)
WBC # BLD: 11.5 K/UL (ref 4.5–11.5)

## 2023-12-05 PROCEDURE — 99214 OFFICE O/P EST MOD 30 MIN: CPT | Performed by: FAMILY MEDICINE

## 2023-12-05 PROCEDURE — 1036F TOBACCO NON-USER: CPT | Performed by: FAMILY MEDICINE

## 2023-12-05 PROCEDURE — 3074F SYST BP LT 130 MM HG: CPT | Performed by: FAMILY MEDICINE

## 2023-12-05 PROCEDURE — G8417 CALC BMI ABV UP PARAM F/U: HCPCS | Performed by: FAMILY MEDICINE

## 2023-12-05 PROCEDURE — 87426 SARSCOV CORONAVIRUS AG IA: CPT | Performed by: FAMILY MEDICINE

## 2023-12-05 PROCEDURE — 3078F DIAST BP <80 MM HG: CPT | Performed by: FAMILY MEDICINE

## 2023-12-05 PROCEDURE — G8484 FLU IMMUNIZE NO ADMIN: HCPCS | Performed by: FAMILY MEDICINE

## 2023-12-05 PROCEDURE — G8427 DOCREV CUR MEDS BY ELIG CLIN: HCPCS | Performed by: FAMILY MEDICINE

## 2023-12-05 PROCEDURE — 87804 INFLUENZA ASSAY W/OPTIC: CPT | Performed by: FAMILY MEDICINE

## 2023-12-05 PROCEDURE — 87880 STREP A ASSAY W/OPTIC: CPT | Performed by: FAMILY MEDICINE

## 2023-12-05 RX ORDER — CEFDINIR 300 MG/1
300 CAPSULE ORAL 2 TIMES DAILY
Qty: 14 CAPSULE | Refills: 0 | Status: SHIPPED | OUTPATIENT
Start: 2023-12-05 | End: 2023-12-12

## 2023-12-05 RX ORDER — CHLORHEXIDINE GLUCONATE 213 G/1000ML
SOLUTION TOPICAL
Qty: 473 ML | Refills: 2 | Status: SHIPPED | OUTPATIENT
Start: 2023-12-05 | End: 2023-12-19

## 2023-12-05 ASSESSMENT — SLEEP AND FATIGUE QUESTIONNAIRES
HOW LIKELY ARE YOU TO NOD OFF OR FALL ASLEEP WHILE WATCHING TV: 2
HOW LIKELY ARE YOU TO NOD OFF OR FALL ASLEEP WHEN YOU ARE A PASSENGER IN A CAR FOR AN HOUR WITHOUT A BREAK: 0
NECK CIRCUMFERENCE (INCHES): 18.5
HOW LIKELY ARE YOU TO NOD OFF OR FALL ASLEEP WHILE LYING DOWN TO REST IN THE AFTERNOON WHEN CIRCUMSTANCES PERMIT: 3
HOW LIKELY ARE YOU TO NOD OFF OR FALL ASLEEP IN A CAR, WHILE STOPPED FOR A FEW MINUTES IN TRAFFIC: 0
HOW LIKELY ARE YOU TO NOD OFF OR FALL ASLEEP WHILE SITTING AND TALKING TO SOMEONE: 0
HOW LIKELY ARE YOU TO NOD OFF OR FALL ASLEEP WHILE SITTING INACTIVE IN A PUBLIC PLACE: 0
HOW LIKELY ARE YOU TO NOD OFF OR FALL ASLEEP WHILE SITTING AND READING: 0
ESS TOTAL SCORE: 7
HOW LIKELY ARE YOU TO NOD OFF OR FALL ASLEEP WHILE SITTING QUIETLY AFTER LUNCH WITHOUT ALCOHOL: 2

## 2023-12-05 ASSESSMENT — ENCOUNTER SYMPTOMS
CONSTIPATION: 0
ABDOMINAL PAIN: 0
EYE DISCHARGE: 0
SINUS PAIN: 0
VOMITING: 0
RHINORRHEA: 0
COUGH: 0
SORE THROAT: 1
SINUS PRESSURE: 0
NAUSEA: 0
DIARRHEA: 0
WHEEZING: 0
SHORTNESS OF BREATH: 0
BACK PAIN: 0

## 2023-12-05 NOTE — PROGRESS NOTES
Cervical: No cervical adenopathy. Skin:     General: Skin is warm and dry. Findings: Rash (areas of induration and erythema along the pubis area with overlying ulceration) present. Neurological:      General: No focal deficit present. Mental Status: He is alert and oriented to person, place, and time. Gait: Gait normal.   Psychiatric:         Mood and Affect: Mood and affect normal.         Speech: Speech normal.         Behavior: Behavior normal.         Thought Content:  Thought content normal.         Labs:  CBC with Differential:    Lab Results   Component Value Date/Time    WBC 6.6 09/25/2023 12:10 PM    RBC 5.13 09/25/2023 12:10 PM    HGB 14.7 09/25/2023 12:10 PM    HCT 44.9 09/25/2023 12:10 PM     09/25/2023 12:10 PM    MCV 87.5 09/25/2023 12:10 PM    MCH 28.7 09/25/2023 12:10 PM    MCHC 32.7 09/25/2023 12:10 PM    RDW 13.3 09/25/2023 12:10 PM    LYMPHOPCT 39 09/25/2023 12:10 PM    MONOPCT 8 09/25/2023 12:10 PM    BASOPCT 1 09/25/2023 12:10 PM    MONOSABS 0.52 09/25/2023 12:10 PM    LYMPHSABS 2.55 09/25/2023 12:10 PM    EOSABS 0.17 09/25/2023 12:10 PM    BASOSABS 0.05 09/25/2023 12:10 PM     CMP:    Lab Results   Component Value Date/Time     09/25/2023 12:10 PM    K 4.4 09/25/2023 12:10 PM     09/25/2023 12:10 PM    CO2 22 09/25/2023 12:10 PM    BUN 17 09/25/2023 12:10 PM    CREATININE 0.9 09/25/2023 12:10 PM    GFRAA >60 09/21/2022 12:25 PM    LABGLOM >60 09/25/2023 12:10 PM    GLUCOSE 88 09/25/2023 12:10 PM    PROT 8.0 09/25/2023 12:10 PM    LABALBU 4.5 09/25/2023 12:10 PM    CALCIUM 9.7 09/25/2023 12:10 PM    BILITOT 0.4 09/25/2023 12:10 PM    ALKPHOS 97 09/25/2023 12:10 PM    AST 27 09/25/2023 12:10 PM    ALT 21 09/25/2023 12:10 PM     U/A:    Lab Results   Component Value Date/Time    NITRITE Neg 05/31/2022 01:16 PM    COLORU Yellow 09/25/2023 12:10 PM    PROTEINU NEGATIVE 09/25/2023 12:10 PM    PHUR 6.0 09/25/2023 12:10 PM    WBCUA 0 TO 5 09/25/2023 12:10 PM

## 2023-12-06 LAB
HEPATITIS C ANTIBODY: NONREACTIVE
HIV AG/AB: NONREACTIVE

## 2023-12-07 LAB
C. TRACHOMATIS DNA ,URINE: NEGATIVE
N. GONORRHOEAE DNA, URINE: NEGATIVE

## 2023-12-08 LAB
CULTURE: NORMAL
CULTURE: NORMAL
HSV I/II AB, IGM: 0.55 IV
HSV I/II IGG: 0.95 IV
SPECIMEN DESCRIPTION: NORMAL

## 2024-02-19 DIAGNOSIS — I10 ESSENTIAL HYPERTENSION: ICD-10-CM

## 2024-02-19 RX ORDER — LISINOPRIL 10 MG/1
10 TABLET ORAL DAILY
Qty: 90 TABLET | Refills: 1 | Status: SHIPPED | OUTPATIENT
Start: 2024-02-19

## 2024-03-08 DIAGNOSIS — I10 ESSENTIAL HYPERTENSION: ICD-10-CM

## 2024-03-08 RX ORDER — LISINOPRIL 10 MG/1
10 TABLET ORAL DAILY
Qty: 90 TABLET | Refills: 1 | Status: SHIPPED | OUTPATIENT
Start: 2024-03-08

## 2024-03-08 RX ORDER — ERGOCALCIFEROL 1.25 MG/1
50000 CAPSULE ORAL WEEKLY
Qty: 12 CAPSULE | Refills: 3 | Status: SHIPPED | OUTPATIENT
Start: 2024-03-08

## 2024-03-08 RX ORDER — BACLOFEN 10 MG/1
10 TABLET ORAL NIGHTLY PRN
Qty: 90 TABLET | Refills: 1 | Status: SHIPPED | OUTPATIENT
Start: 2024-03-08

## 2024-03-11 RX ORDER — BACLOFEN 10 MG/1
TABLET ORAL
Qty: 90 TABLET | Refills: 1 | OUTPATIENT
Start: 2024-03-11

## 2024-03-26 ENCOUNTER — TELEPHONE (OUTPATIENT)
Dept: PRIMARY CARE CLINIC | Age: 29
End: 2024-03-26

## 2024-03-27 ENCOUNTER — HOSPITAL ENCOUNTER (OUTPATIENT)
Dept: SLEEP CENTER | Age: 29
Discharge: HOME | End: 2024-03-27
Payer: MEDICARE

## 2024-03-27 DIAGNOSIS — G47.10 HYPERSOMNOLENCE: ICD-10-CM

## 2024-03-27 PROCEDURE — 95810 POLYSOM 6/> YRS 4/> PARAM: CPT

## 2024-03-28 VITALS — HEART RATE: 78 BPM | DIASTOLIC BLOOD PRESSURE: 78 MMHG | SYSTOLIC BLOOD PRESSURE: 120 MMHG | OXYGEN SATURATION: 95 %

## 2024-03-28 ASSESSMENT — SLEEP AND FATIGUE QUESTIONNAIRES
HOW LIKELY ARE YOU TO NOD OFF OR FALL ASLEEP WHILE LYING DOWN TO REST IN THE AFTERNOON WHEN CIRCUMSTANCES PERMIT: HIGH CHANCE OF DOZING
HOW LIKELY ARE YOU TO NOD OFF OR FALL ASLEEP WHILE SITTING QUIETLY AFTER LUNCH WITHOUT ALCOHOL: HIGH CHANCE OF DOZING
HOW LIKELY ARE YOU TO NOD OFF OR FALL ASLEEP WHILE SITTING AND TALKING TO SOMEONE: SLIGHT CHANCE OF DOZING
HOW LIKELY ARE YOU TO NOD OFF OR FALL ASLEEP WHILE SITTING AND READING: MODERATE CHANCE OF DOZING
ESS TOTAL SCORE: 15
HOW LIKELY ARE YOU TO NOD OFF OR FALL ASLEEP IN A CAR, WHILE STOPPED FOR A FEW MINUTES IN TRAFFIC: WOULD NEVER DOZE
HOW LIKELY ARE YOU TO NOD OFF OR FALL ASLEEP WHILE SITTING INACTIVE IN A PUBLIC PLACE: SLIGHT CHANCE OF DOZING
HOW LIKELY ARE YOU TO NOD OFF OR FALL ASLEEP WHEN YOU ARE A PASSENGER IN A CAR FOR AN HOUR WITHOUT A BREAK: HIGH CHANCE OF DOZING
HOW LIKELY ARE YOU TO NOD OFF OR FALL ASLEEP WHILE WATCHING TV: MODERATE CHANCE OF DOZING

## 2024-03-30 ENCOUNTER — OFFICE VISIT (OUTPATIENT)
Dept: PRIMARY CARE CLINIC | Age: 29
End: 2024-03-30

## 2024-03-30 VITALS
OXYGEN SATURATION: 98 % | HEIGHT: 78 IN | WEIGHT: 288 LBS | HEART RATE: 82 BPM | TEMPERATURE: 98 F | DIASTOLIC BLOOD PRESSURE: 80 MMHG | BODY MASS INDEX: 33.32 KG/M2 | SYSTOLIC BLOOD PRESSURE: 114 MMHG

## 2024-03-30 DIAGNOSIS — R73.01 IMPAIRED FASTING GLUCOSE: ICD-10-CM

## 2024-03-30 DIAGNOSIS — G47.10 HYPERSOMNOLENCE: ICD-10-CM

## 2024-03-30 DIAGNOSIS — E55.9 VITAMIN D DEFICIENCY: ICD-10-CM

## 2024-03-30 DIAGNOSIS — F31.9 BIPOLAR DISEASE, CHRONIC (HCC): ICD-10-CM

## 2024-03-30 DIAGNOSIS — R63.4 WEIGHT LOSS: ICD-10-CM

## 2024-03-30 DIAGNOSIS — I10 ESSENTIAL HYPERTENSION: Primary | ICD-10-CM

## 2024-03-30 ASSESSMENT — PATIENT HEALTH QUESTIONNAIRE - PHQ9
3. TROUBLE FALLING OR STAYING ASLEEP: NEARLY EVERY DAY
9. THOUGHTS THAT YOU WOULD BE BETTER OFF DEAD, OR OF HURTING YOURSELF: NOT AT ALL
7. TROUBLE CONCENTRATING ON THINGS, SUCH AS READING THE NEWSPAPER OR WATCHING TELEVISION: NEARLY EVERY DAY
SUM OF ALL RESPONSES TO PHQ QUESTIONS 1-9: 13
4. FEELING TIRED OR HAVING LITTLE ENERGY: SEVERAL DAYS
6. FEELING BAD ABOUT YOURSELF - OR THAT YOU ARE A FAILURE OR HAVE LET YOURSELF OR YOUR FAMILY DOWN: NOT AT ALL
SUM OF ALL RESPONSES TO PHQ QUESTIONS 1-9: 13
8. MOVING OR SPEAKING SO SLOWLY THAT OTHER PEOPLE COULD HAVE NOTICED. OR THE OPPOSITE, BEING SO FIGETY OR RESTLESS THAT YOU HAVE BEEN MOVING AROUND A LOT MORE THAN USUAL: SEVERAL DAYS
1. LITTLE INTEREST OR PLEASURE IN DOING THINGS: SEVERAL DAYS
SUM OF ALL RESPONSES TO PHQ QUESTIONS 1-9: 13
SUM OF ALL RESPONSES TO PHQ QUESTIONS 1-9: 13
10. IF YOU CHECKED OFF ANY PROBLEMS, HOW DIFFICULT HAVE THESE PROBLEMS MADE IT FOR YOU TO DO YOUR WORK, TAKE CARE OF THINGS AT HOME, OR GET ALONG WITH OTHER PEOPLE: NOT DIFFICULT AT ALL
5. POOR APPETITE OR OVEREATING: NEARLY EVERY DAY
SUM OF ALL RESPONSES TO PHQ9 QUESTIONS 1 & 2: 2
2. FEELING DOWN, DEPRESSED OR HOPELESS: SEVERAL DAYS

## 2024-03-30 ASSESSMENT — ENCOUNTER SYMPTOMS
DIARRHEA: 0
WHEEZING: 0
CONSTIPATION: 0
BACK PAIN: 0
VOMITING: 0
SHORTNESS OF BREATH: 0
ABDOMINAL PAIN: 0
COUGH: 0
NAUSEA: 0

## 2024-03-30 NOTE — PROGRESS NOTES
hands/arms   Neurological:      General: No focal deficit present.      Mental Status: He is alert and oriented to person, place, and time.      Gait: Gait normal.   Psychiatric:         Mood and Affect: Affect normal. Mood is anxious.         Speech: Speech is rapid and pressured and tangential.         Behavior: Behavior is hyperactive.         Thought Content: Thought content normal.         Labs:  CBC with Differential:    Lab Results   Component Value Date/Time    WBC 11.5 12/05/2023 12:55 PM    RBC 5.17 12/05/2023 12:55 PM    HGB 14.7 12/05/2023 12:55 PM    HCT 45.2 12/05/2023 12:55 PM     12/05/2023 12:55 PM    MCV 87.4 12/05/2023 12:55 PM    MCH 28.4 12/05/2023 12:55 PM    MCHC 32.5 12/05/2023 12:55 PM    RDW 13.7 12/05/2023 12:55 PM    LYMPHOPCT 15 12/05/2023 12:55 PM    MONOPCT 9 12/05/2023 12:55 PM    BASOPCT 1 12/05/2023 12:55 PM    MONOSABS 1.01 12/05/2023 12:55 PM    LYMPHSABS 1.74 12/05/2023 12:55 PM    EOSABS 0.16 12/05/2023 12:55 PM    BASOSABS 0.06 12/05/2023 12:55 PM     CMP:    Lab Results   Component Value Date/Time     12/05/2023 12:55 PM    K 4.4 12/05/2023 12:55 PM    CL 98 12/05/2023 12:55 PM    CO2 23 12/05/2023 12:55 PM    BUN 14 12/05/2023 12:55 PM    CREATININE 0.8 12/05/2023 12:55 PM    GFRAA >60 09/21/2022 12:25 PM    LABGLOM >60 12/05/2023 12:55 PM    GLUCOSE 77 12/05/2023 12:55 PM    PROT 8.1 12/05/2023 12:55 PM    LABALBU 4.6 12/05/2023 12:55 PM    CALCIUM 9.7 12/05/2023 12:55 PM    BILITOT 0.6 12/05/2023 12:55 PM    ALKPHOS 109 12/05/2023 12:55 PM    AST 22 12/05/2023 12:55 PM    ALT 22 12/05/2023 12:55 PM     U/A:    Lab Results   Component Value Date/Time    NITRITE Neg 05/31/2022 01:16 PM    COLORU Yellow 09/25/2023 12:10 PM    PROTEINU NEGATIVE 09/25/2023 12:10 PM    PHUR 6.0 09/25/2023 12:10 PM    WBCUA 0 TO 5 09/25/2023 12:10 PM    RBCUA 0 TO 2 09/25/2023 12:10 PM    MUCUS PRESENT 09/25/2023 12:10 PM    BACTERIA MODERATE 09/14/2021 11:41 AM    CLARITYU Clear

## 2024-04-06 ENCOUNTER — HOSPITAL ENCOUNTER (OUTPATIENT)
Age: 29
Discharge: HOME | End: 2024-04-06
Payer: MEDICARE

## 2024-04-06 DIAGNOSIS — E55.9 VITAMIN D DEFICIENCY: ICD-10-CM

## 2024-04-06 DIAGNOSIS — R73.01 IMPAIRED FASTING GLUCOSE: ICD-10-CM

## 2024-04-06 DIAGNOSIS — I10 ESSENTIAL HYPERTENSION: ICD-10-CM

## 2024-04-06 LAB
25(OH)D3 SERPL-MCNC: 38.6 NG/ML (ref 30–100)
ALBUMIN SERPL-MCNC: 4.8 G/DL (ref 3.5–5.2)
ALP SERPL-CCNC: 96 U/L (ref 40–129)
ALT SERPL-CCNC: 16 U/L (ref 0–40)
ANION GAP SERPL CALCULATED.3IONS-SCNC: 11 MMOL/L (ref 7–16)
AST SERPL-CCNC: 21 U/L (ref 0–39)
BASOPHILS # BLD: 0.04 K/UL (ref 0–0.2)
BASOPHILS NFR BLD: 1 % (ref 0–2)
BILIRUB SERPL-MCNC: 0.4 MG/DL (ref 0–1.2)
BILIRUB UR QL STRIP: NEGATIVE
BUN SERPL-MCNC: 18 MG/DL (ref 6–20)
CALCIUM SERPL-MCNC: 9.9 MG/DL (ref 8.6–10.2)
CHLORIDE SERPL-SCNC: 100 MMOL/L (ref 98–107)
CHOLEST SERPL-MCNC: 189 MG/DL
CLARITY UR: CLEAR
CO2 SERPL-SCNC: 26 MMOL/L (ref 22–29)
COLOR UR: YELLOW
COMMENT: ABNORMAL
CREAT SERPL-MCNC: 0.9 MG/DL (ref 0.7–1.2)
EOSINOPHIL # BLD: 0.14 K/UL (ref 0.05–0.5)
EOSINOPHILS RELATIVE PERCENT: 2 % (ref 0–6)
ERYTHROCYTE [DISTWIDTH] IN BLOOD BY AUTOMATED COUNT: 13.1 % (ref 11.5–15)
GFR SERPL CREATININE-BSD FRML MDRD: >90 ML/MIN/1.73M2
GLUCOSE SERPL-MCNC: 93 MG/DL (ref 74–99)
GLUCOSE UR STRIP-MCNC: NEGATIVE MG/DL
HBA1C MFR BLD: 5.1 % (ref 4–5.6)
HCT VFR BLD AUTO: 44.7 % (ref 37–54)
HDLC SERPL-MCNC: 54 MG/DL
HGB BLD-MCNC: 15.4 G/DL (ref 12.5–16.5)
HGB UR QL STRIP.AUTO: NEGATIVE
IMM GRANULOCYTES # BLD AUTO: <0.03 K/UL (ref 0–0.58)
IMM GRANULOCYTES NFR BLD: 0 % (ref 0–5)
KETONES UR STRIP-MCNC: NEGATIVE MG/DL
LDLC SERPL CALC-MCNC: 127 MG/DL
LEUKOCYTE ESTERASE UR QL STRIP: NEGATIVE
LYMPHOCYTES NFR BLD: 3.14 K/UL (ref 1.5–4)
LYMPHOCYTES RELATIVE PERCENT: 36 % (ref 20–42)
MCH RBC QN AUTO: 28.8 PG (ref 26–35)
MCHC RBC AUTO-ENTMCNC: 34.5 G/DL (ref 32–34.5)
MCV RBC AUTO: 83.7 FL (ref 80–99.9)
MONOCYTES NFR BLD: 0.59 K/UL (ref 0.1–0.95)
MONOCYTES NFR BLD: 7 % (ref 2–12)
NEUTROPHILS NFR BLD: 55 % (ref 43–80)
NEUTS SEG NFR BLD: 4.74 K/UL (ref 1.8–7.3)
NITRITE UR QL STRIP: NEGATIVE
PH UR STRIP: 6 [PH] (ref 5–9)
PLATELET # BLD AUTO: 287 K/UL (ref 130–450)
PMV BLD AUTO: 9.3 FL (ref 7–12)
POTASSIUM SERPL-SCNC: 4 MMOL/L (ref 3.5–5)
PROT SERPL-MCNC: 8.1 G/DL (ref 6.4–8.3)
PROT UR STRIP-MCNC: NEGATIVE MG/DL
RBC # BLD AUTO: 5.34 M/UL (ref 3.8–5.8)
SODIUM SERPL-SCNC: 137 MMOL/L (ref 132–146)
SP GR UR STRIP: >1.03 (ref 1–1.03)
TRIGL SERPL-MCNC: 40 MG/DL
TSH SERPL DL<=0.05 MIU/L-ACNC: 2.36 UIU/ML (ref 0.27–4.2)
UROBILINOGEN UR STRIP-ACNC: 1 EU/DL (ref 0–1)
VLDLC SERPL CALC-MCNC: 8 MG/DL
WBC OTHER # BLD: 8.7 K/UL (ref 4.5–11.5)

## 2024-04-06 PROCEDURE — 81003 URINALYSIS AUTO W/O SCOPE: CPT

## 2024-04-06 PROCEDURE — 80053 COMPREHEN METABOLIC PANEL: CPT

## 2024-04-06 PROCEDURE — 36415 COLL VENOUS BLD VENIPUNCTURE: CPT

## 2024-04-06 PROCEDURE — 85025 COMPLETE CBC W/AUTO DIFF WBC: CPT

## 2024-04-06 PROCEDURE — 83036 HEMOGLOBIN GLYCOSYLATED A1C: CPT

## 2024-04-06 PROCEDURE — 80061 LIPID PANEL: CPT

## 2024-04-06 PROCEDURE — 84443 ASSAY THYROID STIM HORMONE: CPT

## 2024-04-06 PROCEDURE — 82306 VITAMIN D 25 HYDROXY: CPT

## 2024-04-30 DIAGNOSIS — G47.33 OSA (OBSTRUCTIVE SLEEP APNEA): Primary | ICD-10-CM

## 2024-08-27 ENCOUNTER — TELEPHONE (OUTPATIENT)
Dept: PRIMARY CARE CLINIC | Age: 29
End: 2024-08-27

## 2024-08-27 NOTE — TELEPHONE ENCOUNTER
Pt requesting letter for new apartment allowing him to have his cat as an emotional support animal

## 2024-08-28 ENCOUNTER — TELEPHONE (OUTPATIENT)
Dept: PRIMARY CARE CLINIC | Age: 29
End: 2024-08-28

## 2024-08-28 NOTE — TELEPHONE ENCOUNTER
Mom calling, faxed request for emotional support cat, sent on 08/27/24 to our office.   Also insurance form attached to fax for  Kody Major, 11/01/1962.

## 2024-09-04 DIAGNOSIS — I10 ESSENTIAL HYPERTENSION: ICD-10-CM

## 2024-09-04 RX ORDER — ERGOCALCIFEROL 1.25 MG/1
50000 CAPSULE, LIQUID FILLED ORAL WEEKLY
Qty: 12 CAPSULE | Refills: 3 | Status: SHIPPED | OUTPATIENT
Start: 2024-09-04

## 2024-09-04 RX ORDER — BACLOFEN 10 MG/1
10 TABLET ORAL NIGHTLY PRN
Qty: 90 TABLET | Refills: 1 | Status: SHIPPED | OUTPATIENT
Start: 2024-09-04

## 2024-09-04 RX ORDER — LISINOPRIL 10 MG/1
10 TABLET ORAL DAILY
Qty: 90 TABLET | Refills: 1 | Status: SHIPPED | OUTPATIENT
Start: 2024-09-04

## 2024-10-17 ENCOUNTER — OFFICE VISIT (OUTPATIENT)
Dept: PRIMARY CARE CLINIC | Age: 29
End: 2024-10-17

## 2024-10-17 VITALS
TEMPERATURE: 97.7 F | SYSTOLIC BLOOD PRESSURE: 114 MMHG | DIASTOLIC BLOOD PRESSURE: 66 MMHG | OXYGEN SATURATION: 97 % | WEIGHT: 293 LBS | BODY MASS INDEX: 33.9 KG/M2 | HEART RATE: 91 BPM | HEIGHT: 78 IN

## 2024-10-17 DIAGNOSIS — F42.4 SKIN-PICKING DISORDER: ICD-10-CM

## 2024-10-17 DIAGNOSIS — Z00.00 INITIAL MEDICARE ANNUAL WELLNESS VISIT: Primary | ICD-10-CM

## 2024-10-17 DIAGNOSIS — F84.9 PERVASIVE DEVELOPMENTAL DISORDER: ICD-10-CM

## 2024-10-17 DIAGNOSIS — E55.9 VITAMIN D INSUFFICIENCY: ICD-10-CM

## 2024-10-17 DIAGNOSIS — L03.312 CELLULITIS OF BACK EXCEPT BUTTOCK: ICD-10-CM

## 2024-10-17 DIAGNOSIS — F31.9 BIPOLAR DISEASE, CHRONIC (HCC): ICD-10-CM

## 2024-10-17 DIAGNOSIS — R73.01 IMPAIRED FASTING GLUCOSE: ICD-10-CM

## 2024-10-17 DIAGNOSIS — I10 ESSENTIAL HYPERTENSION: ICD-10-CM

## 2024-10-17 DIAGNOSIS — F84.0 AUTISM: ICD-10-CM

## 2024-10-17 DIAGNOSIS — Z00.00 INITIAL MEDICARE ANNUAL WELLNESS VISIT: ICD-10-CM

## 2024-10-17 LAB
ALBUMIN: 4.7 G/DL (ref 3.5–5.2)
ALP BLD-CCNC: 105 U/L (ref 40–129)
ALT SERPL-CCNC: 16 U/L (ref 0–40)
ANION GAP SERPL CALCULATED.3IONS-SCNC: 15 MMOL/L (ref 7–16)
AST SERPL-CCNC: 22 U/L (ref 0–39)
BASOPHILS ABSOLUTE: 0.04 K/UL (ref 0–0.2)
BASOPHILS RELATIVE PERCENT: 1 % (ref 0–2)
BILIRUB SERPL-MCNC: 0.6 MG/DL (ref 0–1.2)
BILIRUBIN, URINE: NEGATIVE
BUN BLDV-MCNC: 14 MG/DL (ref 6–20)
CALCIUM SERPL-MCNC: 9.8 MG/DL (ref 8.6–10.2)
CHLORIDE BLD-SCNC: 100 MMOL/L (ref 98–107)
CHOLESTEROL, TOTAL: 205 MG/DL
CO2: 24 MMOL/L (ref 22–29)
COLOR, UA: YELLOW
COMMENT: NORMAL
CREAT SERPL-MCNC: 0.8 MG/DL (ref 0.7–1.2)
EOSINOPHILS ABSOLUTE: 0.12 K/UL (ref 0.05–0.5)
EOSINOPHILS RELATIVE PERCENT: 2 % (ref 0–6)
GFR, ESTIMATED: >90 ML/MIN/1.73M2
GLUCOSE BLD-MCNC: 95 MG/DL (ref 74–99)
GLUCOSE URINE: NEGATIVE MG/DL
HBA1C MFR BLD: 5.4 % (ref 4–5.6)
HCT VFR BLD CALC: 47.1 % (ref 37–54)
HDLC SERPL-MCNC: 60 MG/DL
HEMOGLOBIN: 15.4 G/DL (ref 12.5–16.5)
IMMATURE GRANULOCYTES %: 0 % (ref 0–5)
IMMATURE GRANULOCYTES ABSOLUTE: <0.03 K/UL (ref 0–0.58)
KETONES, URINE: NEGATIVE MG/DL
LDL CHOLESTEROL: 132 MG/DL
LEUKOCYTE ESTERASE, URINE: NEGATIVE
LYMPHOCYTES ABSOLUTE: 2.13 K/UL (ref 1.5–4)
LYMPHOCYTES RELATIVE PERCENT: 27 % (ref 20–42)
MCH RBC QN AUTO: 28.8 PG (ref 26–35)
MCHC RBC AUTO-ENTMCNC: 32.7 G/DL (ref 32–34.5)
MCV RBC AUTO: 88 FL (ref 80–99.9)
MONOCYTES ABSOLUTE: 0.49 K/UL (ref 0.1–0.95)
MONOCYTES RELATIVE PERCENT: 6 % (ref 2–12)
NEUTROPHILS ABSOLUTE: 4.98 K/UL (ref 1.8–7.3)
NEUTROPHILS RELATIVE PERCENT: 64 % (ref 43–80)
NITRITE, URINE: NEGATIVE
PDW BLD-RTO: 13.3 % (ref 11.5–15)
PH, URINE: 8 (ref 5–9)
PLATELET # BLD: 286 K/UL (ref 130–450)
PMV BLD AUTO: 10.1 FL (ref 7–12)
POTASSIUM SERPL-SCNC: 4.7 MMOL/L (ref 3.5–5)
PROTEIN UA: NEGATIVE MG/DL
RBC # BLD: 5.35 M/UL (ref 3.8–5.8)
SODIUM BLD-SCNC: 139 MMOL/L (ref 132–146)
SPECIFIC GRAVITY UA: 1.02 (ref 1–1.03)
TOTAL PROTEIN: 8.3 G/DL (ref 6.4–8.3)
TRIGL SERPL-MCNC: 64 MG/DL
TSH SERPL DL<=0.05 MIU/L-ACNC: 1.41 UIU/ML (ref 0.27–4.2)
TURBIDITY: CLEAR
URIC ACID: 5.9 MG/DL (ref 3.4–7)
URINE HGB: NEGATIVE
UROBILINOGEN, URINE: 0.2 EU/DL (ref 0–1)
VITAMIN D 25-HYDROXY: 46.1 NG/ML (ref 30–100)
VLDLC SERPL CALC-MCNC: 13 MG/DL
WBC # BLD: 7.8 K/UL (ref 4.5–11.5)

## 2024-10-17 RX ORDER — CEPHALEXIN 500 MG/1
500 CAPSULE ORAL 2 TIMES DAILY
Qty: 14 CAPSULE | Refills: 0 | Status: SHIPPED | OUTPATIENT
Start: 2024-10-17 | End: 2024-10-24

## 2024-10-17 SDOH — ECONOMIC STABILITY: FOOD INSECURITY: WITHIN THE PAST 12 MONTHS, YOU WORRIED THAT YOUR FOOD WOULD RUN OUT BEFORE YOU GOT MONEY TO BUY MORE.: NEVER TRUE

## 2024-10-17 SDOH — ECONOMIC STABILITY: FOOD INSECURITY: WITHIN THE PAST 12 MONTHS, THE FOOD YOU BOUGHT JUST DIDN'T LAST AND YOU DIDN'T HAVE MONEY TO GET MORE.: NEVER TRUE

## 2024-10-17 SDOH — ECONOMIC STABILITY: INCOME INSECURITY: HOW HARD IS IT FOR YOU TO PAY FOR THE VERY BASICS LIKE FOOD, HOUSING, MEDICAL CARE, AND HEATING?: NOT HARD AT ALL

## 2024-10-17 ASSESSMENT — PATIENT HEALTH QUESTIONNAIRE - PHQ9
2. FEELING DOWN, DEPRESSED OR HOPELESS: SEVERAL DAYS
SUM OF ALL RESPONSES TO PHQ QUESTIONS 1-9: 12
10. IF YOU CHECKED OFF ANY PROBLEMS, HOW DIFFICULT HAVE THESE PROBLEMS MADE IT FOR YOU TO DO YOUR WORK, TAKE CARE OF THINGS AT HOME, OR GET ALONG WITH OTHER PEOPLE: SOMEWHAT DIFFICULT
7. TROUBLE CONCENTRATING ON THINGS, SUCH AS READING THE NEWSPAPER OR WATCHING TELEVISION: NEARLY EVERY DAY
SUM OF ALL RESPONSES TO PHQ QUESTIONS 1-9: 12
SUM OF ALL RESPONSES TO PHQ QUESTIONS 1-9: 12
5. POOR APPETITE OR OVEREATING: SEVERAL DAYS
SUM OF ALL RESPONSES TO PHQ QUESTIONS 1-9: 12
4. FEELING TIRED OR HAVING LITTLE ENERGY: SEVERAL DAYS
SUM OF ALL RESPONSES TO PHQ9 QUESTIONS 1 & 2: 2
3. TROUBLE FALLING OR STAYING ASLEEP: SEVERAL DAYS
6. FEELING BAD ABOUT YOURSELF - OR THAT YOU ARE A FAILURE OR HAVE LET YOURSELF OR YOUR FAMILY DOWN: SEVERAL DAYS
9. THOUGHTS THAT YOU WOULD BE BETTER OFF DEAD, OR OF HURTING YOURSELF: NOT AT ALL
1. LITTLE INTEREST OR PLEASURE IN DOING THINGS: SEVERAL DAYS
8. MOVING OR SPEAKING SO SLOWLY THAT OTHER PEOPLE COULD HAVE NOTICED. OR THE OPPOSITE, BEING SO FIGETY OR RESTLESS THAT YOU HAVE BEEN MOVING AROUND A LOT MORE THAN USUAL: NEARLY EVERY DAY

## 2024-10-17 ASSESSMENT — COLUMBIA-SUICIDE SEVERITY RATING SCALE - C-SSRS
2. HAVE YOU ACTUALLY HAD ANY THOUGHTS OF KILLING YOURSELF?: NO
6. HAVE YOU EVER DONE ANYTHING, STARTED TO DO ANYTHING, OR PREPARED TO DO ANYTHING TO END YOUR LIFE?: NO
1. WITHIN THE PAST MONTH, HAVE YOU WISHED YOU WERE DEAD OR WISHED YOU COULD GO TO SLEEP AND NOT WAKE UP?: NO

## 2024-10-17 ASSESSMENT — LIFESTYLE VARIABLES
HOW MANY STANDARD DRINKS CONTAINING ALCOHOL DO YOU HAVE ON A TYPICAL DAY: 1 OR 2
HOW OFTEN DO YOU HAVE A DRINK CONTAINING ALCOHOL: 2-4 TIMES A MONTH

## 2024-10-17 NOTE — PROGRESS NOTES
Assessment and screening values have been reviewed and are found in Flowsheets. The following problems were reviewed today and where indicated follow up appointments were made and/or referrals ordered.    Positive Risk Factor Screenings with Interventions:        Depression:  PHQ-2 Score: 2  PHQ-9 Total Score: 12  Total Score Interpretation: 10-14 = moderate depression  Interventions:  New referral to psych placed     Drug Use:         Interventions:  Encouraged him to stop vaping- use THC in other forms        General HRA Questions:  Select all that apply: (!) Social Isolation, New or Increased Fatigue, Stress, Anger, Loneliness  Interventions Fatigue:  Check labs  Interventions - Loneliness:  Patient declined any further interventions or treatment  Interventions - Social Isolation:  Patient declined any further interventions or treatment  Interventions - Stress:  Referral to Psych  Interventions - Anger:  Patient declined any further interventions or treatment       Poor Eating Habits/Diet:  Do you eat balanced/healthy meals regularly?: (!) No  Interventions:  See AVS for additional education material    Abnormal BMI (obese):  Body mass index is 33.43 kg/m². (!) Abnormal  Interventions:  See AVS for additional education material      Dentist Screen:  Have you seen the dentist within the past year?: (!) No    Intervention:  Advised to schedule with their dentist    Hearing Screen:  Do you or your family notice any trouble with your hearing that hasn't been managed with hearing aids?: (!) Yes    Interventions:  Patient declines any further evaluation or treatment     Safety:  Do you have any tripping hazards - loose or unsecured carpets or rugs?: (!) Yes  Interventions:  Patient declined any further interventions or treatment    ADL's:   Patient reports needing help with:  Select all that apply: (!) Banking/Finances, Transportation  Interventions:  Mom helps with finances and gets transportation through

## 2024-10-17 NOTE — PATIENT INSTRUCTIONS
Eating Healthy Foods: Care Instructions  With every meal, you can make healthy food choices. Try to eat a variety of fruits, vegetables, whole grains, lean proteins, and low-fat dairy products. This can help you get the right balance of nutrients, including vitamins and minerals. Small changes add up over time. You can start by adding one healthy food to your meals each day.    Try to make half your plate fruits and vegetables, one-fourth whole grains, and one-fourth lean proteins. Try including dairy with your meals.   Eat more fruits and vegetables. Try to have them with most meals and snacks.   Foods for healthy eating        Fruits   These can be fresh, frozen, canned, or dried.  Try to choose whole fruit rather than fruit juice.  Eat a variety of colors.        Vegetables   These can be fresh, frozen, canned, or dried.  Beans, peas, and lentils count too.        Whole grains   Choose whole-grain breads, cereals, and noodles.  Try brown rice.        Lean proteins   These can include lean meat, poultry, fish, and eggs.  You can also have tofu, beans, peas, lentils, nuts, and seeds.        Dairy   Try milk, yogurt, and cheese.  Choose low-fat or fat-free when you can.  If you need to, use lactose-free milk or fortified plant-based milk products, such as soy milk.        Water   Drink water when you're thirsty.  Limit sugar-sweetened drinks, including soda, fruit drinks, and sports drinks.  Where can you learn more?  Go to https://www.[facility].net/patientEd and enter T756 to learn more about \"Eating Healthy Foods: Care Instructions.\"  Current as of: September 20, 2023  Content Version: 14.2  © 2024 [employer]994.   Care instructions adapted under license by [employer]. If you have questions about a medical condition or this instruction, always ask your healthcare professional. [employer] disclaims any warranty or liability for your use of this information.           Starting a Weight

## 2025-01-24 ENCOUNTER — TELEPHONE (OUTPATIENT)
Dept: PRIMARY CARE CLINIC | Age: 30
End: 2025-01-24

## 2025-01-24 NOTE — TELEPHONE ENCOUNTER
I can't advise that the patient takes someone else's prescription, especially for a controlled medication.  If he hasn't been on something like that before, we have no idea what his response will be.  The safest thing that he can try that might help calming him down would be Benadryl

## 2025-01-24 NOTE — TELEPHONE ENCOUNTER
Mom sent in the below my chart message but she sent it under her mychart, not the patient's.    Gianna. Pedro is being amazing and helpful since my surgery yesterday. Trying to go easy on him but of course he's being pulled in different directions by Smooth and I. Are Xanax for him allowable? I would only give him 1/2. He is getting frustrated frequently. Haven't given him any because not sure about his drug interactions. Can he do a video call w you? If that's something he can take would like him to have his own prescription. Pls advise.

## 2025-02-26 DIAGNOSIS — I10 ESSENTIAL HYPERTENSION: ICD-10-CM

## 2025-02-26 RX ORDER — BACLOFEN 10 MG/1
10 TABLET ORAL NIGHTLY PRN
Qty: 90 TABLET | Refills: 1 | Status: SHIPPED | OUTPATIENT
Start: 2025-02-26

## 2025-02-26 RX ORDER — LISINOPRIL 10 MG/1
10 TABLET ORAL DAILY
Qty: 90 TABLET | Refills: 1 | Status: SHIPPED | OUTPATIENT
Start: 2025-02-26

## 2025-03-23 ENCOUNTER — HOSPITAL ENCOUNTER (EMERGENCY)
Age: 30
Discharge: HOME | End: 2025-03-23
Payer: MEDICARE

## 2025-03-23 VITALS
HEART RATE: 99 BPM | WEIGHT: 295 LBS | BODY MASS INDEX: 33.65 KG/M2 | SYSTOLIC BLOOD PRESSURE: 132 MMHG | TEMPERATURE: 98.6 F | RESPIRATION RATE: 16 BRPM | DIASTOLIC BLOOD PRESSURE: 99 MMHG | OXYGEN SATURATION: 100 %

## 2025-03-23 DIAGNOSIS — J06.9 VIRAL UPPER RESPIRATORY TRACT INFECTION WITH COUGH: Primary | ICD-10-CM

## 2025-03-23 LAB
FLUAV RNA RESP QL NAA+PROBE: NOT DETECTED
FLUBV RNA RESP QL NAA+PROBE: NOT DETECTED
SARS-COV-2 RNA RESP QL NAA+PROBE: NOT DETECTED
SOURCE: NORMAL
SPECIMEN DESCRIPTION: NORMAL

## 2025-03-23 PROCEDURE — 99211 OFF/OP EST MAY X REQ PHY/QHP: CPT

## 2025-03-23 PROCEDURE — 87636 SARSCOV2 & INF A&B AMP PRB: CPT

## 2025-03-23 ASSESSMENT — PAIN - FUNCTIONAL ASSESSMENT: PAIN_FUNCTIONAL_ASSESSMENT: NONE - DENIES PAIN

## 2025-03-23 NOTE — ED PROVIDER NOTES
the plan of care and counseling regarding the diagnosis and prognosis.  Questions are answered at this time and are agreeable with the plan.    ASSESSMENT     1. Viral upper respiratory tract infection with cough        DISPOSITION   Discharged home.  Patient condition is stable.    Discharge Instructions:   Patient referred to  Rhonda Vargas DO  [address] [facility] [address]  [phone]    Call in 1 day  As needed, If symptoms worsen, For reevaluation    NEW MEDICATIONS     New Prescriptions    No medications on file     Electronically signed by LUPIS Walter NP   DD: 3/23/25  **This report was transcribed using voice recognition software. Every effort was made to ensure accuracy; however, inadvertent computerized transcription errors may be present.  END OF ED PROVIDER NOTE      Gaston Bray APRN - NP  03/23/25 9925

## 2025-04-11 ENCOUNTER — TELEPHONE (OUTPATIENT)
Dept: PRIMARY CARE CLINIC | Age: 30
End: 2025-04-11

## 2025-04-11 NOTE — TELEPHONE ENCOUNTER
Pt asking for appt. Before you go on maternity leave, he has a  on  so he had to cancel that but asking for another time. He works 8-4 on Monday and Tuesday, LM for him when you call.

## 2025-04-23 ENCOUNTER — RESULTS FOLLOW-UP (OUTPATIENT)
Dept: PRIMARY CARE CLINIC | Age: 30
End: 2025-04-23

## 2025-04-23 ENCOUNTER — OFFICE VISIT (OUTPATIENT)
Dept: PRIMARY CARE CLINIC | Age: 30
End: 2025-04-23

## 2025-04-23 VITALS
HEART RATE: 85 BPM | BODY MASS INDEX: 35.75 KG/M2 | OXYGEN SATURATION: 97 % | TEMPERATURE: 97.9 F | DIASTOLIC BLOOD PRESSURE: 78 MMHG | HEIGHT: 78 IN | SYSTOLIC BLOOD PRESSURE: 132 MMHG | WEIGHT: 309 LBS

## 2025-04-23 DIAGNOSIS — E55.9 VITAMIN D INSUFFICIENCY: ICD-10-CM

## 2025-04-23 DIAGNOSIS — I10 ESSENTIAL HYPERTENSION: ICD-10-CM

## 2025-04-23 DIAGNOSIS — R73.01 IMPAIRED FASTING GLUCOSE: ICD-10-CM

## 2025-04-23 DIAGNOSIS — I10 ESSENTIAL HYPERTENSION: Primary | ICD-10-CM

## 2025-04-23 DIAGNOSIS — F84.9 PERVASIVE DEVELOPMENTAL DISORDER: ICD-10-CM

## 2025-04-23 DIAGNOSIS — F31.9 BIPOLAR DISEASE, CHRONIC (HCC): ICD-10-CM

## 2025-04-23 DIAGNOSIS — F42.4 SKIN-PICKING DISORDER: ICD-10-CM

## 2025-04-23 DIAGNOSIS — L03.312 CELLULITIS OF BACK EXCEPT BUTTOCK: ICD-10-CM

## 2025-04-23 DIAGNOSIS — F84.0 AUTISM: ICD-10-CM

## 2025-04-23 LAB
ALBUMIN: 4.3 G/DL (ref 3.5–5.2)
ALP BLD-CCNC: 89 U/L (ref 40–129)
ALT SERPL-CCNC: 22 U/L (ref 0–50)
ANION GAP SERPL CALCULATED.3IONS-SCNC: 11 MMOL/L (ref 7–16)
AST SERPL-CCNC: 28 U/L (ref 0–50)
BASOPHILS ABSOLUTE: 0.04 K/UL (ref 0–0.2)
BASOPHILS RELATIVE PERCENT: 1 % (ref 0–2)
BILIRUB SERPL-MCNC: 0.6 MG/DL (ref 0–1.2)
BILIRUBIN, URINE: NEGATIVE
BUN BLDV-MCNC: 16 MG/DL (ref 6–20)
CALCIUM SERPL-MCNC: 10 MG/DL (ref 8.6–10)
CHLORIDE BLD-SCNC: 103 MMOL/L (ref 98–107)
CHOLESTEROL, TOTAL: 216 MG/DL
CO2: 25 MMOL/L (ref 22–29)
COLOR, UA: YELLOW
COMMENT: NORMAL
CREAT SERPL-MCNC: 0.7 MG/DL (ref 0.7–1.2)
EOSINOPHILS ABSOLUTE: 0.15 K/UL (ref 0.05–0.5)
EOSINOPHILS RELATIVE PERCENT: 2 % (ref 0–6)
GFR, ESTIMATED: >90 ML/MIN/1.73M2
GLUCOSE BLD-MCNC: 97 MG/DL (ref 74–99)
GLUCOSE URINE: NEGATIVE MG/DL
HBA1C MFR BLD: 5.4 % (ref 4–5.6)
HCT VFR BLD CALC: 43.3 % (ref 37–54)
HDLC SERPL-MCNC: 58 MG/DL
HEMOGLOBIN: 14.6 G/DL (ref 12.5–16.5)
IMMATURE GRANULOCYTES %: 0 % (ref 0–5)
IMMATURE GRANULOCYTES ABSOLUTE: 0.03 K/UL (ref 0–0.58)
KETONES, URINE: NEGATIVE MG/DL
LDL CHOLESTEROL: 148 MG/DL
LEUKOCYTE ESTERASE, URINE: NEGATIVE
LYMPHOCYTES ABSOLUTE: 2 K/UL (ref 1.5–4)
LYMPHOCYTES RELATIVE PERCENT: 27 % (ref 20–42)
MCH RBC QN AUTO: 28.9 PG (ref 26–35)
MCHC RBC AUTO-ENTMCNC: 33.7 G/DL (ref 32–34.5)
MCV RBC AUTO: 85.7 FL (ref 80–99.9)
MONOCYTES ABSOLUTE: 0.53 K/UL (ref 0.1–0.95)
MONOCYTES RELATIVE PERCENT: 7 % (ref 2–12)
NEUTROPHILS ABSOLUTE: 4.61 K/UL (ref 1.8–7.3)
NEUTROPHILS RELATIVE PERCENT: 63 % (ref 43–80)
NITRITE, URINE: NEGATIVE
PDW BLD-RTO: 13.2 % (ref 11.5–15)
PH, URINE: 6 (ref 5–8)
PLATELET # BLD: 282 K/UL (ref 130–450)
PMV BLD AUTO: 9.8 FL (ref 7–12)
POTASSIUM SERPL-SCNC: 4.3 MMOL/L (ref 3.5–5.1)
PROTEIN UA: NEGATIVE MG/DL
RBC # BLD: 5.05 M/UL (ref 3.8–5.8)
SODIUM BLD-SCNC: 139 MMOL/L (ref 136–145)
SPECIFIC GRAVITY UA: 1.02 (ref 1–1.03)
TOTAL PROTEIN: 8 G/DL (ref 6.4–8.3)
TRIGL SERPL-MCNC: 51 MG/DL
TSH SERPL DL<=0.05 MIU/L-ACNC: 1.68 UIU/ML (ref 0.27–4.2)
TURBIDITY: CLEAR
URIC ACID: 6 MG/DL (ref 3.4–7)
URINE HGB: NEGATIVE
UROBILINOGEN, URINE: 0.2 EU/DL (ref 0–1)
VITAMIN D 25-HYDROXY: 41.7 NG/ML (ref 30–100)
VLDLC SERPL CALC-MCNC: 10 MG/DL
WBC # BLD: 7.4 K/UL (ref 4.5–11.5)

## 2025-04-23 RX ORDER — LISINOPRIL 10 MG/1
10 TABLET ORAL DAILY
Qty: 90 TABLET | Refills: 1 | Status: SHIPPED | OUTPATIENT
Start: 2025-04-23

## 2025-04-23 RX ORDER — CEPHALEXIN 500 MG/1
500 CAPSULE ORAL 2 TIMES DAILY
Qty: 14 CAPSULE | Refills: 0 | Status: SHIPPED | OUTPATIENT
Start: 2025-04-23 | End: 2025-04-30

## 2025-04-23 RX ORDER — ERGOCALCIFEROL 1.25 MG/1
50000 CAPSULE, LIQUID FILLED ORAL WEEKLY
Qty: 12 CAPSULE | Refills: 3 | Status: SHIPPED | OUTPATIENT
Start: 2025-04-23

## 2025-04-23 SDOH — ECONOMIC STABILITY: FOOD INSECURITY: WITHIN THE PAST 12 MONTHS, THE FOOD YOU BOUGHT JUST DIDN'T LAST AND YOU DIDN'T HAVE MONEY TO GET MORE.: NEVER TRUE

## 2025-04-23 SDOH — ECONOMIC STABILITY: FOOD INSECURITY: WITHIN THE PAST 12 MONTHS, YOU WORRIED THAT YOUR FOOD WOULD RUN OUT BEFORE YOU GOT MONEY TO BUY MORE.: NEVER TRUE

## 2025-04-23 ASSESSMENT — ENCOUNTER SYMPTOMS
DIARRHEA: 0
ABDOMINAL PAIN: 0
NAUSEA: 0
BACK PAIN: 0
COUGH: 0
SHORTNESS OF BREATH: 0
WHEEZING: 0
VOMITING: 0
CONSTIPATION: 0

## 2025-04-23 ASSESSMENT — PATIENT HEALTH QUESTIONNAIRE - PHQ9
5. POOR APPETITE OR OVEREATING: NOT AT ALL
SUM OF ALL RESPONSES TO PHQ QUESTIONS 1-9: 2
4. FEELING TIRED OR HAVING LITTLE ENERGY: NOT AT ALL
1. LITTLE INTEREST OR PLEASURE IN DOING THINGS: NOT AT ALL
8. MOVING OR SPEAKING SO SLOWLY THAT OTHER PEOPLE COULD HAVE NOTICED. OR THE OPPOSITE, BEING SO FIGETY OR RESTLESS THAT YOU HAVE BEEN MOVING AROUND A LOT MORE THAN USUAL: NOT AT ALL
6. FEELING BAD ABOUT YOURSELF - OR THAT YOU ARE A FAILURE OR HAVE LET YOURSELF OR YOUR FAMILY DOWN: NOT AT ALL
10. IF YOU CHECKED OFF ANY PROBLEMS, HOW DIFFICULT HAVE THESE PROBLEMS MADE IT FOR YOU TO DO YOUR WORK, TAKE CARE OF THINGS AT HOME, OR GET ALONG WITH OTHER PEOPLE: NOT DIFFICULT AT ALL
7. TROUBLE CONCENTRATING ON THINGS, SUCH AS READING THE NEWSPAPER OR WATCHING TELEVISION: NOT AT ALL
SUM OF ALL RESPONSES TO PHQ QUESTIONS 1-9: 2
SUM OF ALL RESPONSES TO PHQ QUESTIONS 1-9: 2
3. TROUBLE FALLING OR STAYING ASLEEP: NOT AT ALL
SUM OF ALL RESPONSES TO PHQ QUESTIONS 1-9: 2
9. THOUGHTS THAT YOU WOULD BE BETTER OFF DEAD, OR OF HURTING YOURSELF: NOT AT ALL
2. FEELING DOWN, DEPRESSED OR HOPELESS: MORE THAN HALF THE DAYS

## 2025-04-23 NOTE — ASSESSMENT & PLAN NOTE
Chronic, at goal (stable), continue current plan pending work up below    Orders:    CBC with Auto Differential; Future    Comprehensive Metabolic Panel; Future    Lipid Panel; Future    TSH; Future    Urinalysis; Future    Uric Acid; Future    lisinopril (PRINIVIL;ZESTRIL) 10 MG tablet; Take 1 tablet by mouth daily

## 2025-04-23 NOTE — PROGRESS NOTES
25  Carlito George : 1995 Sex: male  Age: 29 y.o.    Chief Complaint   Patient presents with    Hypertension     HPI:  29 y.o. male presents today for follow up of chronic medical conditions, medication refills and FBW.  Patient's chart, medical, surgical and medication history all reviewed.    Hypertension   The patient presents today for follow up of HTN.  The problem is well controlled. Risk factors for coronary artery disease include male, HTN and family Hx. Current treatments include lisinopril (Prinivil). The patient is compliant all of the time.  Lifestyle changes the patient has made include  none .  Today the patient is complaining of none.        Patient has a long standing history of autism and bipolar disorder.  Difficulty with anxiety.  Follows with Dr. Malloy for medication management.  Patient noting more sleepiness at our last visit.  Having issues with cognitive processing.      PHQ-9 Total Score: 2 (2025  8:29 AM)  Thoughts that you would be better off dead, or of hurting yourself in some way: 0 (2025  8:29 AM)      ROS:  Review of Systems   Constitutional:  Positive for unexpected weight change (gain). Negative for chills, fatigue and fever.   Respiratory:  Negative for cough, shortness of breath and wheezing.    Cardiovascular:  Negative for chest pain and palpitations.   Gastrointestinal:  Negative for abdominal pain, constipation, diarrhea, nausea and vomiting.   Musculoskeletal:  Negative for arthralgias and back pain.   Skin:  Positive for rash.   Neurological:  Negative for dizziness and headaches.   Psychiatric/Behavioral:  Positive for behavioral problems and sleep disturbance. Negative for dysphoric mood. The patient is nervous/anxious.    All other systems reviewed and are negative.     Current Outpatient Medications on File Prior to Visit   Medication Sig Dispense Refill    baclofen (LIORESAL) 10 MG tablet Take 1 tablet by mouth nightly as needed (muscle

## (undated) DEVICE — SPONGE GZ W4XL4IN RAYON POLY FILL CVR W/ NONWOVEN FAB

## (undated) DEVICE — GRADUATE TRIANG MEASURE 1000ML BLK PRNT